# Patient Record
Sex: MALE | Race: WHITE
[De-identification: names, ages, dates, MRNs, and addresses within clinical notes are randomized per-mention and may not be internally consistent; named-entity substitution may affect disease eponyms.]

---

## 2024-04-25 ENCOUNTER — HOSPITAL ENCOUNTER (EMERGENCY)
Dept: HOSPITAL 101 - ER | Age: 69
Discharge: ANOTHER HOSPITAL | End: 2024-04-25
Payer: COMMERCIAL

## 2024-04-25 VITALS — SYSTOLIC BLOOD PRESSURE: 125 MMHG | DIASTOLIC BLOOD PRESSURE: 83 MMHG | HEART RATE: 86 BPM

## 2024-04-25 VITALS
SYSTOLIC BLOOD PRESSURE: 126 MMHG | TEMPERATURE: 98.42 F | HEART RATE: 62 BPM | OXYGEN SATURATION: 95 % | DIASTOLIC BLOOD PRESSURE: 82 MMHG

## 2024-04-25 VITALS — OXYGEN SATURATION: 95 %

## 2024-04-25 VITALS — OXYGEN SATURATION: 96 % | HEART RATE: 82 BPM

## 2024-04-25 VITALS — SYSTOLIC BLOOD PRESSURE: 126 MMHG | DIASTOLIC BLOOD PRESSURE: 82 MMHG

## 2024-04-25 VITALS — BODY MASS INDEX: 31.6 KG/M2

## 2024-04-25 VITALS — OXYGEN SATURATION: 95 % | HEART RATE: 80 BPM

## 2024-04-25 VITALS — SYSTOLIC BLOOD PRESSURE: 121 MMHG | HEART RATE: 84 BPM | DIASTOLIC BLOOD PRESSURE: 83 MMHG | OXYGEN SATURATION: 94 %

## 2024-04-25 VITALS — OXYGEN SATURATION: 96 %

## 2024-04-25 DIAGNOSIS — I21.3: Primary | ICD-10-CM

## 2024-04-25 LAB
ADD MANUAL DIFF: NO
ALANINE AMINOTRANSFERASE: 90 U/L (ref 16–63)
ALBUMIN GLOBULIN RATIO: 1
ALBUMIN LEVEL: 3.8 G/DL (ref 3.4–5)
ALKALINE PHOSPHATASE: 54 U/L (ref 46–116)
ANION GAP: 14.3
ASPARTATE AMINO TRANSFERASE: 593 U/L (ref 15–37)
BLOOD UREA NITROGEN: 13 MG/DL (ref 7–18)
CALCIUM: 9.1 MG/DL (ref 8.5–10.1)
CARBON DIOXIDE: 26.5 MMOL/L (ref 21–32)
CHLORIDE: 96 MMOL/L (ref 98–107)
ESTIMATED GFR (AFRICAN AMERICA: >60 (ref 60–?)
ESTIMATED GFR (NON-AFRICAN AME: >60 (ref 60–?)
GLOBULIN: 3.7 G/DL
GLUCOSE: 157 MG/DL (ref 74–106)
HEMATOCRIT: 41.6 % (ref 42–54)
HEMOGLOBIN: 14.2 G/DL (ref 14–18)
IMMATURE GRANULOCYTES ABS AUTO: 0.04 10^3/UL (ref 0–0.03)
IMMATURE GRANULOCYTES PCT AUTO: 0.4 % (ref 0–0.5)
LYMPHOCYTES  ABSOLUTE AUTO: 1 10^3/UL (ref 1.2–3.8)
MCV RBC: 92.9 FL (ref 80–94)
MEAN CORPUSCULAR HEMOGLOBIN: 31.7 PG (ref 25.9–34)
MEAN CORPUSCULAR HGB CONC: 34.1 G/DL (ref 29.9–35.2)
NON-UH HIE TROPONIN I HIGH SENSITIVITY: ABNORMAL PG/ML (ref 0–20)
PARTIAL THROMBOPLASTIN TIME: 27.3 SEC (ref 22.3–36.2)
PLATELET COUNT: 217 10^3/UL (ref 150–450)
POTASSIUM: 3.8 MMOL/L (ref 3.5–5.1)
RED BLOOD COUNT: 4.48 10^6/UL (ref 4.7–6.1)
SODIUM: 133 MMOL/L (ref 136–145)
TOTAL PROTEIN: 7.5 G/DL (ref 6.4–8.2)
WHITE BLOOD COUNT: 10.5 10^3/UL (ref 4–11)

## 2024-04-25 PROCEDURE — 96375 TX/PRO/DX INJ NEW DRUG ADDON: CPT

## 2024-04-25 PROCEDURE — 99285 EMERGENCY DEPT VISIT HI MDM: CPT

## 2024-04-25 PROCEDURE — 93005 ELECTROCARDIOGRAM TRACING: CPT

## 2024-04-25 PROCEDURE — 84484 ASSAY OF TROPONIN QUANT: CPT

## 2024-04-25 PROCEDURE — 85025 COMPLETE CBC W/AUTO DIFF WBC: CPT

## 2024-04-25 PROCEDURE — 80053 COMPREHEN METABOLIC PANEL: CPT

## 2024-04-25 PROCEDURE — 71045 X-RAY EXAM CHEST 1 VIEW: CPT

## 2024-04-25 PROCEDURE — 96374 THER/PROPH/DIAG INJ IV PUSH: CPT

## 2024-04-25 PROCEDURE — 36415 COLL VENOUS BLD VENIPUNCTURE: CPT

## 2024-04-25 PROCEDURE — 85730 THROMBOPLASTIN TIME PARTIAL: CPT

## 2024-04-25 RX ADMIN — ASPIRIN 81 MG 324 MG: 81 TABLET ORAL at 06:48

## 2024-04-25 RX ADMIN — HEPARIN SODIUM 5000 UNIT: 5000 INJECTION, SOLUTION INTRAVENOUS; SUBCUTANEOUS at 06:52

## 2024-04-25 RX ADMIN — TICAGRELOR 180 MG: 90 TABLET ORAL at 06:52

## 2024-04-25 RX ADMIN — MORPHINE SULFATE 4 MG: 4 INJECTION INTRAVENOUS at 06:53

## 2024-04-25 RX ADMIN — NITROGLYCERIN 0.4 MG: 0.4 TABLET SUBLINGUAL at 07:10

## 2024-04-26 ENCOUNTER — DOCUMENTATION (OUTPATIENT)
Dept: CARDIOLOGY | Facility: CLINIC | Age: 69
End: 2024-04-26
Payer: COMMERCIAL

## 2024-04-29 ENCOUNTER — PATIENT OUTREACH (OUTPATIENT)
Dept: CARDIOLOGY | Facility: CLINIC | Age: 69
End: 2024-04-29

## 2024-04-29 NOTE — PROGRESS NOTES
Discharge Facility: [facility]  Discharge Diagnosis:  STEMI  Admission Date:  4/25/24  Discharge Date: 4/29/24    PCP Appointment Date: Patient encouraged to make an appt  Specialist Appointment Date: Dr Coelho 5/3/24  Hospital Encounter and Summary: not available at this time   See discharge assessment below for further details         Engagement  Call Start Time: 0942 (4/30/2024 10:36 AM)    Medications  Medications reviewed with patient/caregiver?: Yes (4/30/2024 10:36 AM)  Is the patient having any side effects they believe may be caused by any medication additions or changes?: (!) Yes (Patient states he is having some SOB with his Brilinta and is calling Dr Coelho's office now) (4/30/2024 10:36 AM)  Does the patient have all medications ordered at discharge?: Yes (4/30/2024 10:36 AM)  Prescription Comments: This CM does not have a medication req (4/30/2024 10:36 AM)  Is the patient taking all medications as directed (includes completed medication regime)?: -- (Patient states yes) (4/30/2024 10:36 AM)    Appointments  Does the patient have a primary care provider?: Yes (4/30/2024 10:36 AM)  Care Management Interventions: Advised patient to make appointment (4/30/2024 10:36 AM)  Care Management Interventions: Advised patient to keep appointment (4/30/2024 10:36 AM)    Patient Teaching  What is the patient's perception of their health status since discharge?: Improving (4/30/2024 10:36 AM)  Is the patient/caregiver able to teach back the hierarchy of who to call/visit for symptoms/problems? PCP, Specialist, Home Health nurse, Urgent Care, ED, 911: Yes (4/30/2024 10:36 AM)  Patient/Caregiver Education Comments: Reviewed monitoring Right Groin site. Medications reviewed which medication were stated to this CM that were prescribed. Patient is calling Dr Coelho's office about medications (4/30/2024 10:36 AM)    Wrap Up  Wrap Up Additional Comments: Patient denies any CP. Patient states he has some SOB which he  states is related to Brilinta and is calling Dr Coelho's office. This CM does review that SOB needs evaluated at ED. Patient states that his Right groin site continues with bandage and he will remove later but he denies any draiange from the site,increased pain redness or swelling. Patient is aware of folllow up appt with Dr Coelho and is encouraged to make an appt with PCP (4/30/2024 10:36 AM)

## 2024-04-30 ENCOUNTER — TELEPHONE (OUTPATIENT)
Dept: CARDIOLOGY | Facility: CLINIC | Age: 69
End: 2024-04-30
Payer: COMMERCIAL

## 2024-04-30 NOTE — TELEPHONE ENCOUNTER
Wife called into office that Brilinta has been causing the patient SOB. POV 5/2 with Dr. Prasanna Coelho DO. Advised that they should continue as is and will discuss change at OV.     Pharmacy advised patient to hold Statin drug while he takes a 10 dose of cholcine for inflammatory. Inquiring if they should do this or not?    To Dr. Prasanna Coelho DO for review.

## 2024-05-02 ENCOUNTER — OFFICE VISIT (OUTPATIENT)
Dept: CARDIOLOGY | Facility: CLINIC | Age: 69
End: 2024-05-02
Payer: COMMERCIAL

## 2024-05-02 VITALS
DIASTOLIC BLOOD PRESSURE: 78 MMHG | SYSTOLIC BLOOD PRESSURE: 108 MMHG | HEIGHT: 70 IN | WEIGHT: 227 LBS | BODY MASS INDEX: 32.5 KG/M2 | HEART RATE: 64 BPM

## 2024-05-02 DIAGNOSIS — I25.5 CARDIOMYOPATHY, ISCHEMIC: ICD-10-CM

## 2024-05-02 DIAGNOSIS — I31.39 PERICARDIAL EFFUSION (HHS-HCC): ICD-10-CM

## 2024-05-02 DIAGNOSIS — Z98.61 HISTORY OF PTCA: ICD-10-CM

## 2024-05-02 DIAGNOSIS — I50.9 CONGESTIVE HEART FAILURE, UNSPECIFIED HF CHRONICITY, UNSPECIFIED HEART FAILURE TYPE (MULTI): ICD-10-CM

## 2024-05-02 DIAGNOSIS — I21.3 ST ELEVATION MYOCARDIAL INFARCTION (STEMI), UNSPECIFIED ARTERY (MULTI): ICD-10-CM

## 2024-05-02 DIAGNOSIS — Z87.891 FORMER SMOKER: ICD-10-CM

## 2024-05-02 DIAGNOSIS — R06.02 SHORTNESS OF BREATH: ICD-10-CM

## 2024-05-02 DIAGNOSIS — I25.10 ASHD (ARTERIOSCLEROTIC HEART DISEASE): ICD-10-CM

## 2024-05-02 PROCEDURE — 1160F RVW MEDS BY RX/DR IN RCRD: CPT | Performed by: INTERNAL MEDICINE

## 2024-05-02 PROCEDURE — 99496 TRANSJ CARE MGMT HIGH F2F 7D: CPT | Performed by: INTERNAL MEDICINE

## 2024-05-02 PROCEDURE — 1159F MED LIST DOCD IN RCRD: CPT | Performed by: INTERNAL MEDICINE

## 2024-05-02 PROCEDURE — 1036F TOBACCO NON-USER: CPT | Performed by: INTERNAL MEDICINE

## 2024-05-02 RX ORDER — COLCHICINE 0.6 MG/1
0.6 TABLET ORAL DAILY
COMMUNITY
Start: 2024-04-29 | End: 2024-05-09

## 2024-05-02 RX ORDER — NITROGLYCERIN 0.4 MG/1
0.4 TABLET SUBLINGUAL
COMMUNITY
Start: 2024-04-26

## 2024-05-02 RX ORDER — CARVEDILOL 3.12 MG/1
3.12 TABLET ORAL
COMMUNITY
Start: 2024-04-29

## 2024-05-02 RX ORDER — SPIRONOLACTONE 25 MG/1
12.5 TABLET ORAL 2 TIMES DAILY
COMMUNITY
Start: 2024-04-29

## 2024-05-02 RX ORDER — ASPIRIN 81 MG/1
81 TABLET ORAL DAILY
COMMUNITY
Start: 2024-04-26

## 2024-05-02 RX ORDER — ALBUTEROL SULFATE 90 UG/1
AEROSOL, METERED RESPIRATORY (INHALATION)
COMMUNITY

## 2024-05-02 RX ORDER — CLOPIDOGREL BISULFATE 75 MG/1
75 TABLET ORAL DAILY
Qty: 90 TABLET | Refills: 3 | Status: SHIPPED | OUTPATIENT
Start: 2024-05-02 | End: 2025-05-02

## 2024-05-02 RX ORDER — LANOLIN ALCOHOL/MO/W.PET/CERES
100 CREAM (GRAM) TOPICAL DAILY
COMMUNITY
Start: 2024-03-31

## 2024-05-02 RX ORDER — ATORVASTATIN CALCIUM 80 MG/1
80 TABLET, FILM COATED ORAL
COMMUNITY
Start: 2024-04-26 | End: 2024-05-14 | Stop reason: SINTOL

## 2024-05-02 RX ORDER — CLOPIDOGREL BISULFATE 300 MG/1
600 TABLET, FILM COATED ORAL ONCE
Qty: 2 TABLET | Refills: 0 | Status: SHIPPED | OUTPATIENT
Start: 2024-05-02 | End: 2024-05-02

## 2024-05-02 ASSESSMENT — ENCOUNTER SYMPTOMS: SHORTNESS OF BREATH: 1

## 2024-05-02 NOTE — LETTER
May 2, 2024     Ike Johns MD  [address] [facility] [address]  [facility] [address]    Patient: Juan A Linag   YOB: 1955   Date of Visit: 5/2/2024       Dear Dr. Ike Johns MD:    Thank you for referring Juan A Liang to me for evaluation. Below are my notes for this consultation.  If you have questions, please do not hesitate to call me. I look forward to following your patient along with you.       Sincerely,     Prasanna Coelho, DO      CC: No Recipients  ______________________________________________________________________________________    Subjective   Juan A Liang is a 68 y.o. male       Chief Complaint    Follow-up          68-year-old gentleman here for transitional care management office visit following recent anterior ST elevation myocardial infarction with primary revascularization of the proximal/mid LAD with 2 drug-eluting stents, end-stage interventions of the proximal and mid circumflex with 2 drug-eluting stents and the RCA with 1 drug-eluting stent.  LV function was severe with anteroapical, inferoapical and septal akinesis and ejection fraction of 30%.    Current complaints are orthopnea and difficulty sleeping at night potentially related to Brilinta side effect.  He as he sits here today he has no dyspnea.    There is no prior history of myocardial infarction, no history of diabetes or tobacco use.  He has a strong family history of coronary disease    Notably post MI immediately in the hospital he had a 3 component friction rub with small pericardial effusion consistent with post MI pericarditis.  On today's exam 3 component rub is completely gone.  He has 5 more days of colchicine.  Blood pressures continue to be soft as they were in the hospital, he is not on afterload reduction therapies as of yet as he was intolerant in the hospital and likely would be intolerant at the present time given his current blood pressure readings.    Recommendations: Discontinue  "Brilinta initiate clopidogrel with a loading dose, obtain another limited echocardiogram in 6 weeks to reassess LV function, pericardial effusion and to survey for mural thrombus treat accordingly and if necessary referral for AICD; refer to cardiac rehab in [place].  Follow-up with nurse practitioner in 6 weeks after echo to see if Medication titration is possible.  I will see him again within the next 3 months.         Review of Systems   Constitutional: Positive for malaise/fatigue.   Respiratory:  Positive for shortness of breath.    All other systems reviewed and are negative.           Vitals:    05/02/24 1338   BP: 108/78   BP Location: Right arm   Patient Position: Sitting   Pulse: 64   Weight: 103 kg (227 lb)   Height: 1.778 m (5' 10\")        Objective   Physical Exam  Constitutional:       Appearance: Normal appearance.   HENT:      Nose: Nose normal.   Neck:      Vascular: No carotid bruit.   Cardiovascular:      Rate and Rhythm: Normal rate.      Pulses: Normal pulses.      Heart sounds: Normal heart sounds.   Pulmonary:      Effort: Pulmonary effort is normal.   Abdominal:      General: Bowel sounds are normal.      Palpations: Abdomen is soft.   Musculoskeletal:         General: Normal range of motion.      Cervical back: Normal range of motion.      Right lower leg: No edema.      Left lower leg: No edema.   Skin:     General: Skin is warm and dry.   Neurological:      General: No focal deficit present.      Mental Status: He is alert.   Psychiatric:         Mood and Affect: Mood normal.         Behavior: Behavior normal.         Thought Content: Thought content normal.         Judgment: Judgment normal.         Allergies  Brilinta [ticagrelor]     Current Medications    Current Outpatient Medications:   •  aspirin 81 mg EC tablet, Take 1 tablet (81 mg) by mouth once daily., Disp: , Rfl:   •  atorvastatin (Lipitor) 80 mg tablet, Take 1 tablet (80 mg) by mouth once daily., Disp: , Rfl:   •  carvedilol " (Coreg) 3.125 mg tablet, Take 1 tablet (3.125 mg) by mouth 2 times a day with meals., Disp: , Rfl:   •  colchicine 0.6 mg tablet, Take 1 tablet (0.6 mg) by mouth once daily., Disp: , Rfl:   •  nitroglycerin (Nitrostat) 0.4 mg SL tablet, Place 1 tablet (0.4 mg) under the tongue., Disp: , Rfl:   •  spironolactone (Aldactone) 25 mg tablet, Take 0.5 tablets (12.5 mg) by mouth twice a day., Disp: , Rfl:   •  thiamine 100 mg tablet, Take 1 tablet (100 mg) by mouth once daily., Disp: , Rfl:   •  albuterol 90 mcg/actuation inhaler, INHALE 2 PUFFS EVERY 4-6 HOURS Inhaled as needed for 90 days, Disp: , Rfl:                      Assessment/Plan   1. ASHD (arteriosclerotic heart disease)        2. ST elevation myocardial infarction (STEMI), unspecified artery (Multi)        3. History of PTCA        4. Shortness of breath        5. Cardiomyopathy, ischemic        6. Congestive heart failure, unspecified HF chronicity, unspecified heart failure type (Multi)        7. BMI 32.0-32.9,adult        8. Former smoker        9. Pericardial effusion ([facility])                 Scribe Attestation  By signing my name below, Vanessa BLANK LPN, Scribe   attest that this documentation has been prepared under the direction and in the presence of Prasanna Coelho DO.     Provider Attestation - Scribe documentation    All medical record entries made by the Scribe were at my direction and personally dictated by me. I have reviewed the chart and agree that the record accurately reflects my personal performance of the history, physical exam, discussion and plan.

## 2024-05-02 NOTE — PROGRESS NOTES
Subjective   Juan A Liang is a 68 y.o. male       Chief Complaint    Follow-up          68-year-old gentleman here for transitional care management office visit following recent anterior ST elevation myocardial infarction with primary revascularization of the proximal/mid LAD with 2 drug-eluting stents, end-stage interventions of the proximal and mid circumflex with 2 drug-eluting stents and the RCA with 1 drug-eluting stent.  LV function was severe with anteroapical, inferoapical and septal akinesis and ejection fraction of 30%.    Current complaints are orthopnea and difficulty sleeping at night potentially related to Brilinta side effect.  He as he sits here today he has no dyspnea.    There is no prior history of myocardial infarction, no history of diabetes or tobacco use.  He has a strong family history of coronary disease    Notably post MI immediately in the hospital he had a 3 component friction rub with small pericardial effusion consistent with post MI pericarditis.  On today's exam 3 component rub is completely gone.  He has 5 more days of colchicine.  Blood pressures continue to be soft as they were in the hospital, he is not on afterload reduction therapies as of yet as he was intolerant in the hospital and likely would be intolerant at the present time given his current blood pressure readings.    Recommendations: Discontinue Brilinta initiate clopidogrel with a loading dose, obtain another limited echocardiogram in 6 weeks to reassess LV function, pericardial effusion and to survey for mural thrombus treat accordingly and if necessary referral for AICD; refer to cardiac rehab in [place].  Follow-up with nurse practitioner in 6 weeks after echo to see if Medication titration is possible.  I will see him again within the next 3 months.         Review of Systems   Constitutional: Positive for malaise/fatigue.   Respiratory:  Positive for shortness of breath.    All other systems reviewed and are  "negative.           Vitals:    05/02/24 1338   BP: 108/78   BP Location: Right arm   Patient Position: Sitting   Pulse: 64   Weight: 103 kg (227 lb)   Height: 1.778 m (5' 10\")        Objective   Physical Exam  Constitutional:       Appearance: Normal appearance.   HENT:      Nose: Nose normal.   Neck:      Vascular: No carotid bruit.   Cardiovascular:      Rate and Rhythm: Normal rate.      Pulses: Normal pulses.      Heart sounds: Normal heart sounds.   Pulmonary:      Effort: Pulmonary effort is normal.   Abdominal:      General: Bowel sounds are normal.      Palpations: Abdomen is soft.   Musculoskeletal:         General: Normal range of motion.      Cervical back: Normal range of motion.      Right lower leg: No edema.      Left lower leg: No edema.   Skin:     General: Skin is warm and dry.   Neurological:      General: No focal deficit present.      Mental Status: He is alert.   Psychiatric:         Mood and Affect: Mood normal.         Behavior: Behavior normal.         Thought Content: Thought content normal.         Judgment: Judgment normal.         Allergies  Brilinta [ticagrelor]     Current Medications    Current Outpatient Medications:     aspirin 81 mg EC tablet, Take 1 tablet (81 mg) by mouth once daily., Disp: , Rfl:     atorvastatin (Lipitor) 80 mg tablet, Take 1 tablet (80 mg) by mouth once daily., Disp: , Rfl:     carvedilol (Coreg) 3.125 mg tablet, Take 1 tablet (3.125 mg) by mouth 2 times a day with meals., Disp: , Rfl:     colchicine 0.6 mg tablet, Take 1 tablet (0.6 mg) by mouth once daily., Disp: , Rfl:     nitroglycerin (Nitrostat) 0.4 mg SL tablet, Place 1 tablet (0.4 mg) under the tongue., Disp: , Rfl:     spironolactone (Aldactone) 25 mg tablet, Take 0.5 tablets (12.5 mg) by mouth twice a day., Disp: , Rfl:     thiamine 100 mg tablet, Take 1 tablet (100 mg) by mouth once daily., Disp: , Rfl:     albuterol 90 mcg/actuation inhaler, INHALE 2 PUFFS EVERY 4-6 HOURS Inhaled as needed for 90 " days, Disp: , Rfl:                      Assessment/Plan   1. ASHD (arteriosclerotic heart disease)        2. ST elevation myocardial infarction (STEMI), unspecified artery (Multi)        3. History of PTCA        4. Shortness of breath        5. Cardiomyopathy, ischemic        6. Congestive heart failure, unspecified HF chronicity, unspecified heart failure type (Multi)        7. BMI 32.0-32.9,adult        8. Former smoker        9. Pericardial effusion ([facility])                 Scribe Attestation  By signing my name below, IVanessa LPN, Scribe   attest that this documentation has been prepared under the direction and in the presence of Prasanna Coelho DO.     Provider Attestation - Scribe documentation    All medical record entries made by the Scribe were at my direction and personally dictated by me. I have reviewed the chart and agree that the record accurately reflects my personal performance of the history, physical exam, discussion and plan.

## 2024-05-02 NOTE — PATIENT INSTRUCTIONS
Please bring all medicines, vitamins, and herbal supplements with you when you come to the office.    Prescriptions will not be filled unless you are compliant with your follow up appointments or have a follow up appointment scheduled as per instruction of your physician. Refills should be requested at the time of your visit.     BMI was above normal measurement. Current weight: 103 kg (227 lb)  Weight change since last visit (-) denotes wt loss 227 lbs   Weight loss needed to achieve BMI 25: 53.1 Lbs  Weight loss needed to achieve BMI 30: 18.4 Lbs  Provided instructions on dietary changes  Provided instructions on exercise.

## 2024-05-07 ENCOUNTER — PATIENT OUTREACH (OUTPATIENT)
Dept: CARDIOLOGY | Facility: CLINIC | Age: 69
End: 2024-05-07
Payer: COMMERCIAL

## 2024-05-07 ENCOUNTER — TELEPHONE (OUTPATIENT)
Dept: CARDIOLOGY | Facility: CLINIC | Age: 69
End: 2024-05-07
Payer: COMMERCIAL

## 2024-05-07 NOTE — TELEPHONE ENCOUNTER
Patient would like a referral to cardiac rehab at the [facility]. They need a face sheet and office notes if you are in agreement with this    Please advise

## 2024-05-07 NOTE — PROGRESS NOTES
Unable to reach patient for call back after patient's follow up appointment with PCP.   HARVEYM with call back number for patient to call if needed   If no voicemail available call attempts x 2 were made to contact the patient to assist with any questions or concerns patient may have.

## 2024-05-13 DIAGNOSIS — E78.00 HYPERCHOLESTEREMIA: ICD-10-CM

## 2024-05-13 NOTE — TELEPHONE ENCOUNTER
Patient phone that he was recently prescribed and started Lipitor. States he has extreme fatigue, unmotivated and depressed. He would like off this asap and changed to a different medication. Please advise.    To Dr. Prasanna Coelho, DO

## 2024-05-15 RX ORDER — ROSUVASTATIN CALCIUM 5 MG/1
5 TABLET, COATED ORAL EVERY OTHER DAY
Qty: 45 TABLET | Refills: 3 | Status: SHIPPED | OUTPATIENT
Start: 2024-05-15 | End: 2025-05-15

## 2024-05-24 ENCOUNTER — TELEPHONE (OUTPATIENT)
Dept: CARDIOLOGY | Facility: CLINIC | Age: 69
End: 2024-05-24
Payer: COMMERCIAL

## 2024-05-24 NOTE — TELEPHONE ENCOUNTER
Patient phoned states he has been having trace amount of blood when he blows his nose, believes it is related to taking Aspirin. Denies having blood in his urine or stool. Denies having cardiac complaints. Inquiring if there is alternative he could take, or different dose he could take.    To Dr. Prasanna Coelho DO for review.

## 2024-06-05 ENCOUNTER — PATIENT OUTREACH (OUTPATIENT)
Dept: CARDIOLOGY | Facility: CLINIC | Age: 69
End: 2024-06-05
Payer: COMMERCIAL

## 2024-06-17 ENCOUNTER — HOSPITAL ENCOUNTER (OUTPATIENT)
Dept: CARDIOLOGY | Facility: CLINIC | Age: 69
Discharge: HOME | End: 2024-06-17
Payer: COMMERCIAL

## 2024-06-17 VITALS
BODY MASS INDEX: 32.5 KG/M2 | SYSTOLIC BLOOD PRESSURE: 110 MMHG | DIASTOLIC BLOOD PRESSURE: 68 MMHG | HEIGHT: 70 IN | WEIGHT: 227 LBS

## 2024-06-17 DIAGNOSIS — I21.3 ST ELEVATION MYOCARDIAL INFARCTION (STEMI), UNSPECIFIED ARTERY (MULTI): ICD-10-CM

## 2024-06-17 DIAGNOSIS — I25.5 CARDIOMYOPATHY, ISCHEMIC: ICD-10-CM

## 2024-06-17 DIAGNOSIS — I31.39 PERICARDIAL EFFUSION (HHS-HCC): ICD-10-CM

## 2024-06-17 DIAGNOSIS — Z98.61 HISTORY OF PTCA: ICD-10-CM

## 2024-06-17 DIAGNOSIS — I25.10 ASHD (ARTERIOSCLEROTIC HEART DISEASE): ICD-10-CM

## 2024-06-17 DIAGNOSIS — I50.9 CONGESTIVE HEART FAILURE, UNSPECIFIED HF CHRONICITY, UNSPECIFIED HEART FAILURE TYPE (MULTI): ICD-10-CM

## 2024-06-17 DIAGNOSIS — R06.02 SHORTNESS OF BREATH: ICD-10-CM

## 2024-06-17 PROCEDURE — 2500000004 HC RX 250 GENERAL PHARMACY W/ HCPCS (ALT 636 FOR OP/ED): Performed by: INTERNAL MEDICINE

## 2024-06-17 PROCEDURE — 93308 TTE F-UP OR LMTD: CPT | Performed by: INTERNAL MEDICINE

## 2024-06-17 PROCEDURE — 93308 TTE F-UP OR LMTD: CPT

## 2024-06-18 LAB
EJECTION FRACTION APICAL 4 CHAMBER: 32.6
LEFT VENTRICLE INTERNAL DIMENSION DIASTOLE: 5.91 CM (ref 3.5–6)
LEFT VENTRICULAR OUTFLOW TRACT DIAMETER: 2.5 CM

## 2024-06-19 ENCOUNTER — APPOINTMENT (OUTPATIENT)
Dept: CARDIOLOGY | Facility: CLINIC | Age: 69
End: 2024-06-19
Payer: COMMERCIAL

## 2024-06-19 VITALS
HEART RATE: 68 BPM | SYSTOLIC BLOOD PRESSURE: 110 MMHG | DIASTOLIC BLOOD PRESSURE: 80 MMHG | HEIGHT: 70 IN | BODY MASS INDEX: 28.77 KG/M2 | WEIGHT: 201 LBS

## 2024-06-19 DIAGNOSIS — J90 PLEURAL EFFUSION: ICD-10-CM

## 2024-06-19 DIAGNOSIS — E78.2 MIXED HYPERLIPIDEMIA: ICD-10-CM

## 2024-06-19 DIAGNOSIS — I31.39 PERICARDIAL EFFUSION (HHS-HCC): ICD-10-CM

## 2024-06-19 DIAGNOSIS — I25.10 ASHD (ARTERIOSCLEROTIC HEART DISEASE): ICD-10-CM

## 2024-06-19 DIAGNOSIS — I25.5 CARDIOMYOPATHY, ISCHEMIC: Primary | ICD-10-CM

## 2024-06-19 PROCEDURE — 99214 OFFICE O/P EST MOD 30 MIN: CPT | Performed by: NURSE PRACTITIONER

## 2024-06-19 PROCEDURE — 3008F BODY MASS INDEX DOCD: CPT | Performed by: NURSE PRACTITIONER

## 2024-06-19 PROCEDURE — 1160F RVW MEDS BY RX/DR IN RCRD: CPT | Performed by: NURSE PRACTITIONER

## 2024-06-19 PROCEDURE — 1036F TOBACCO NON-USER: CPT | Performed by: NURSE PRACTITIONER

## 2024-06-19 PROCEDURE — 1159F MED LIST DOCD IN RCRD: CPT | Performed by: NURSE PRACTITIONER

## 2024-06-19 RX ORDER — LOSARTAN POTASSIUM 25 MG/1
25 TABLET ORAL DAILY
Qty: 30 TABLET | Refills: 11 | Status: SHIPPED | OUTPATIENT
Start: 2024-06-19 | End: 2025-06-19

## 2024-06-19 ASSESSMENT — ENCOUNTER SYMPTOMS
IRREGULAR HEARTBEAT: 0
PALPITATIONS: 0
NEAR-SYNCOPE: 0
ORTHOPNEA: 0
DYSPNEA ON EXERTION: 0
PND: 0
SYNCOPE: 0

## 2024-06-19 NOTE — LETTER
"June 19, 2024     Ike Johns MD  [address]  [facility] [address]    Patient: Juan A Liang   YOB: 1955   Date of Visit: 6/19/2024       Dear Dr. Ike Johns MD:    Thank you for referring Juan A Liang to me for evaluation. Below are my notes for this consultation.  If you have questions, please do not hesitate to call me. I look forward to following your patient along with you.       Sincerely,     Karen James, APRN-CNP      CC: No Recipients  ______________________________________________________________________________________    Chief Complaint  \"I think I am doing really good\"     Reason for Visit  Patient presents to the office today for outpatient follow-up for testing follow-up.  Last evaluated in clinic by Dr. Coelho May 2024.  Patient STEMI was April 25, 2024 and EF was 30-35%.  Due to hypotension unable to initiate optimal guideline directed medical therapy, Dr. Coelho repeated echocardiogram early in June 2024 to assist with treatment.  LVEF continues to be right about 30%, LV is mildly dilated with mid/doer anterior septal, apex, distal inferior and anterior lateral dyskinetic walls.  Reviewed in detail with patient, will be more aggressive with initiation of guideline directed medical treatment.    Presents today ambulatory with steady gait.  Accompanied by spouse  Patient denies any hospitalizations or significant changes to interval medical history since last office follow-up.     History of Present Illness   Patient is a very pleasant 69-year-old gentleman who presents to the office today reportedly\" doing good\".  He started cardiac rehab, has returned to work.  He denies any dyspnea on exertion, no orthopnea or PND.  No edema.  No palpitations no history of syncope.  He reports his angina symptom was a sudden onset of shoulder pressure, denies any recurrence.    Reports that prior complaints of shortness of breath have completely abated with " "transition off of Brilinta.    Has a history of essential tremors, does see neurology on a regular basis.    Discussed the 4 pillars of guideline directed medical therapy.  Will add low-dose Cozaar at bedtime, add Jardiance.  Repeat MUGA scan at 90 days post PCI.    Patient reports that overall has no complaint(s) of chest pain, chest pressure/discomfort, claudication, dyspnea, exertional chest pressure/discomfort, fatigue, irregular heart beat, orthopnea, and syncope    Daily activity:   Cardiac rehab  Reports improvement in exercise capacity or functional tolerance since last office visit.    The importance of secondary prevention reviewed:  HTN: Optimal  HLD: Is due for labs  DM: Denies  Smoker: Denies  BMI:  Reviewed the merits of healthy lifestyle choices on overall cardiovascular health.    Will repeat chest x-ray due to left pleural effusion noted on echo, will add diuretic if needed.    Review of Systems   Cardiovascular:  Negative for chest pain, dyspnea on exertion, irregular heartbeat, leg swelling, near-syncope, orthopnea, palpitations, paroxysmal nocturnal dyspnea and syncope.        Visit Vitals  /80 (BP Location: Left arm, Patient Position: Sitting)   Pulse 68   Ht 1.778 m (5' 10\")   Wt 91.2 kg (201 lb)   BMI 28.84 kg/m²   Smoking Status Former   BSA 2.12 m²     Physical Exam  Vitals and nursing note reviewed.   Constitutional:       Appearance: Normal appearance.   Cardiovascular:      Rate and Rhythm: Normal rate and regular rhythm.      Heart sounds: Normal heart sounds.   Pulmonary:      Effort: Pulmonary effort is normal.      Breath sounds: Examination of the left-lower field reveals decreased breath sounds. Decreased breath sounds present.   Musculoskeletal:      Cervical back: Full passive range of motion without pain.      Right lower leg: No edema.      Left lower leg: No edema.   Skin:     General: Skin is cool.   Neurological:      Mental Status: He is alert and oriented to person, " place, and time.   Psychiatric:         Attention and Perception: Attention normal.         Mood and Affect: Mood normal.         Behavior: Behavior is cooperative.        Allergies   Allergen Reactions   • Brilinta [Ticagrelor] Shortness of breath     Current Outpatient Medications   Medication Instructions   • albuterol 90 mcg/actuation inhaler INHALE 2 PUFFS EVERY 4-6 HOURS Inhaled as needed for 90 days   • aspirin 81 mg, oral, Daily   • carvedilol (COREG) 3.125 mg, oral, 2 times daily (morning and late afternoon)   • clopidogrel (PLAVIX) 75 mg, oral, Daily   • empagliflozin (JARDIANCE) 10 mg, oral, Daily   • losartan (COZAAR) 25 mg, oral, Daily   • nitroglycerin (NITROSTAT) 0.4 mg, sublingual   • rosuvastatin (CRESTOR) 5 mg, oral, Every other day   • spironolactone (ALDACTONE) 12.5 mg, oral, 2 times daily   • thiamine (VITAMIN B-1) 100 mg, oral, Daily      Assessment:    ASHD (arteriosclerotic heart disease)  April 25, 2025 AL STEMI emergently Dr. Coelho  Proximal LAD PCI/[place] 3 x 18 and 2.75 x 15 mm   Ostial Diag1 & Diag2 85-90%  Had staged procedure:  Mid RCA PCI/[place] 3.5 x 30 mm  Provisional PTCA PLV  There is also staged procedure to the circumflex proximal and mid but details are not available    LVEF 30 to 35% with akinetic anterior, anterior septal and apical walls    Cardiomyopathy, ischemic  DICM HFrEF 30% June 2024 TTE ( April 25, 2024 TTE 30-35%)  FC II Stage C    GDMT  Coreg: April 2024 discharge  Spironolactone: April 2024 discharge  Jardiance: to pharmacy today  ACE/ARB: Hindered due to hypotension but will attempt to add low-dose Cozaar today.    Right bundle branch block with     Pericardial effusion ([facility])  I believe at time of hospitalization he had a pericardial effusion and was treated with colchicine -repeat TTE June 2024 no evidence of pericardial effusion but it does show a moderate left pleural effusion.    Mixed hyperlipidemia  Tolerating addition of high intensity  "statin    BMI 28.0-28.9,adult  His weight is down approximately 26 pounds since discharge.  He reports that he simply\" stopped eating\" because he had no appetite and nothing would taste good.  He denies any history of diabetes  Reports compliance with preventative cancer screening  Remote smoker    Follows weight regularly at home and reports his appetite is getting better and he is slowly starting to gain weight.  Discussed red flag of unintentional weight gain, they have been cautioned to notify their PCP if continues.    Pleural effusion  June 2024 echocardiogram with incidental finding of a moderate left pleural effusion  He denies any type of shortness of breath, no dyspnea on exertion, no orthopnea no PND.    Plan:     Through informed decision making process incorporating patients unique circumstances, the following treatment plan will be initiated:    1.  Prescription drug management of cardiovascular medication for efficacy, adherence to treatment, side effect assessment and polypharmacy. Current treatment clinically warranted and to continue with following modifications:    - Cozaar 25mg at bedtime or 2 hours after morning dose of coreg  -  Jardiance 10mg daily    2.  CXR pa/lat - follow up on pleural effusions noted on echo    3.  One week labs (lipids, chem6)    4.  MUGA after July 25, 2024 (ICM)    5. Return for follow-up; in the interim, contact the office if new symptoms arise.  Dr. Coelho after testing     6.  If weight loss continues then you need to contact PCP    Karen James  MSN, APRN-CNP, PMHNP-BC  [facility]    Please excuse any errors in grammar or translation related to this dictation. Voice recognition software was utilized to prepare this document.  "

## 2024-06-19 NOTE — ASSESSMENT & PLAN NOTE
"His weight is down approximately 26 pounds since discharge.  He reports that he simply\" stopped eating\" because he had no appetite and nothing would taste good.  He denies any history of diabetes  Reports compliance with preventative cancer screening  Remote smoker    Follows weight regularly at home and reports his appetite is getting better and he is slowly starting to gain weight.  Discussed red flag of unintentional weight gain, they have been cautioned to notify their PCP if continues.  "

## 2024-06-19 NOTE — PROGRESS NOTES
"Chief Complaint  \"I think I am doing really good\"     Reason for Visit  Patient presents to the office today for outpatient follow-up for testing follow-up.  Last evaluated in clinic by Dr. Coelho May 2024.  Patient STEMI was April 25, 2024 and EF was 30-35%.  Due to hypotension unable to initiate optimal guideline directed medical therapy, Dr. Coelho repeated echocardiogram early in June 2024 to assist with treatment.  LVEF continues to be right about 30%, LV is mildly dilated with mid/doer anterior septal, apex, distal inferior and anterior lateral dyskinetic walls.  Reviewed in detail with patient, will be more aggressive with initiation of guideline directed medical treatment.    Presents today ambulatory with steady gait.  Accompanied by spouse  Patient denies any hospitalizations or significant changes to interval medical history since last office follow-up.     History of Present Illness   Patient is a very pleasant 69-year-old gentleman who presents to the office today reportedly\" doing good\".  He started cardiac rehab, has returned to work.  He denies any dyspnea on exertion, no orthopnea or PND.  No edema.  No palpitations no history of syncope.  He reports his angina symptom was a sudden onset of shoulder pressure, denies any recurrence.    Reports that prior complaints of shortness of breath have completely abated with transition off of Brilinta.    Has a history of essential tremors, does see neurology on a regular basis.    Discussed the 4 pillars of guideline directed medical therapy.  Will add low-dose Cozaar at bedtime, add Jardiance.  Repeat MUGA scan at 90 days post PCI.    Patient reports that overall has no complaint(s) of chest pain, chest pressure/discomfort, claudication, dyspnea, exertional chest pressure/discomfort, fatigue, irregular heart beat, orthopnea, and syncope    Daily activity:   Cardiac rehab  Reports improvement in exercise capacity or functional tolerance since last office " "visit.    The importance of secondary prevention reviewed:  HTN: Optimal  HLD: Is due for labs  DM: Denies  Smoker: Denies  BMI:  Reviewed the merits of healthy lifestyle choices on overall cardiovascular health.    Will repeat chest x-ray due to left pleural effusion noted on echo, will add diuretic if needed.    Review of Systems   Cardiovascular:  Negative for chest pain, dyspnea on exertion, irregular heartbeat, leg swelling, near-syncope, orthopnea, palpitations, paroxysmal nocturnal dyspnea and syncope.        Visit Vitals  /80 (BP Location: Left arm, Patient Position: Sitting)   Pulse 68   Ht 1.778 m (5' 10\")   Wt 91.2 kg (201 lb)   BMI 28.84 kg/m²   Smoking Status Former   BSA 2.12 m²     Physical Exam  Vitals and nursing note reviewed.   Constitutional:       Appearance: Normal appearance.   Cardiovascular:      Rate and Rhythm: Normal rate and regular rhythm.      Heart sounds: Normal heart sounds.   Pulmonary:      Effort: Pulmonary effort is normal.      Breath sounds: Examination of the left-lower field reveals decreased breath sounds. Decreased breath sounds present.   Musculoskeletal:      Cervical back: Full passive range of motion without pain.      Right lower leg: No edema.      Left lower leg: No edema.   Skin:     General: Skin is cool.   Neurological:      Mental Status: He is alert and oriented to person, place, and time.   Psychiatric:         Attention and Perception: Attention normal.         Mood and Affect: Mood normal.         Behavior: Behavior is cooperative.        Allergies   Allergen Reactions    Brilinta [Ticagrelor] Shortness of breath     Current Outpatient Medications   Medication Instructions    albuterol 90 mcg/actuation inhaler INHALE 2 PUFFS EVERY 4-6 HOURS Inhaled as needed for 90 days    aspirin 81 mg, oral, Daily    carvedilol (COREG) 3.125 mg, oral, 2 times daily (morning and late afternoon)    clopidogrel (PLAVIX) 75 mg, oral, Daily    empagliflozin (JARDIANCE) 10 " "mg, oral, Daily    losartan (COZAAR) 25 mg, oral, Daily    nitroglycerin (NITROSTAT) 0.4 mg, sublingual    rosuvastatin (CRESTOR) 5 mg, oral, Every other day    spironolactone (ALDACTONE) 12.5 mg, oral, 2 times daily    thiamine (VITAMIN B-1) 100 mg, oral, Daily      Assessment:    ASHD (arteriosclerotic heart disease)  April 25, 2025 AL STEMI emergently Dr. Coelho  Proximal LAD PCI/[place] 3 x 18 and 2.75 x 15 mm   Ostial Diag1 & Diag2 85-90%  Had staged procedure:  Mid RCA PCI/[place] 3.5 x 30 mm  Provisional PTCA PLV  There is also staged procedure to the circumflex proximal and mid but details are not available    LVEF 30 to 35% with akinetic anterior, anterior septal and apical walls    Cardiomyopathy, ischemic  DICM HFrEF 30% June 2024 TTE ( April 25, 2024 TTE 30-35%)  FC II Stage C    GDMT  Coreg: April 2024 discharge  Spironolactone: April 2024 discharge  Jardiance: to pharmacy today  ACE/ARB: Hindered due to hypotension but will attempt to add low-dose Cozaar today.    Right bundle branch block with     Pericardial effusion ([facility])  I believe at time of hospitalization he had a pericardial effusion and was treated with colchicine -repeat TTE June 2024 no evidence of pericardial effusion but it does show a moderate left pleural effusion.    Mixed hyperlipidemia  Tolerating addition of high intensity statin    BMI 28.0-28.9,adult  His weight is down approximately 26 pounds since discharge.  He reports that he simply\" stopped eating\" because he had no appetite and nothing would taste good.  He denies any history of diabetes  Reports compliance with preventative cancer screening  Remote smoker    Follows weight regularly at home and reports his appetite is getting better and he is slowly starting to gain weight.  Discussed red flag of unintentional weight gain, they have been cautioned to notify their PCP if continues.    Pleural effusion  June 2024 echocardiogram with incidental finding of a moderate left " pleural effusion  He denies any type of shortness of breath, no dyspnea on exertion, no orthopnea no PND.    Plan:     Through informed decision making process incorporating patients unique circumstances, the following treatment plan will be initiated:    1.  Prescription drug management of cardiovascular medication for efficacy, adherence to treatment, side effect assessment and polypharmacy. Current treatment clinically warranted and to continue with following modifications:    - Cozaar 25mg at bedtime or 2 hours after morning dose of coreg  -  Jardiance 10mg daily    2.  CXR pa/lat - follow up on pleural effusions noted on echo    3.  One week labs (lipids, chem6)    4.  MUGA after July 25, 2024 (ICM)    5. Return for follow-up; in the interim, contact the office if new symptoms arise.  Dr. Coelho after testing     6.  If weight loss continues then you need to contact PCP    Karen James  MSN, APRN-CNP, PMHNP-BC  [facility]    Please excuse any errors in grammar or translation related to this dictation. Voice recognition software was utilized to prepare this document.

## 2024-06-19 NOTE — PATIENT INSTRUCTIONS
Please bring all medicines, vitamins, and herbal supplements with you when you come to the office.    Prescriptions will not be filled unless you are compliant with your follow up appointments or have a follow up appointment scheduled as per instruction of your physician. Refills should be requested at the time of your visit.     PLAN:   Through informed decision making process incorporating patients unique circumstances, the following treatment plan will be initiated:    1.  Prescription drug management of cardiovascular medication for efficacy, adherence to treatment, side effect assessment and polypharmacy. Current treatment clinically warranted and to continue with following modifications:    - Cozaar 25mg at bedtime or 2 hours after morning dose of coreg  -  Jardiance 10mg daily    2.  CXR pa/lat - follow up on pleural effusions noted on echo    3.  One week labs (lipids, chem6)    4.  MUGA after July 25, 2024 (ICM)    5. Return for follow-up; in the interim, contact the office if new symptoms arise.  Dr. Coelho after testing     6.  If weight loss continues then you need to contact PCP

## 2024-06-19 NOTE — ASSESSMENT & PLAN NOTE
I believe at time of hospitalization he had a pericardial effusion and was treated with colchicine -repeat TTE June 2024 no evidence of pericardial effusion but it does show a moderate left pleural effusion.

## 2024-06-19 NOTE — ASSESSMENT & PLAN NOTE
DICM HFrEF 30% June 2024 TTE ( April 25, 2024 TTE 30-35%)  FC II Stage C    GDMT  Coreg: April 2024 discharge  Spironolactone: April 2024 discharge  Jardiance: to pharmacy today  ACE/ARB: Hindered due to hypotension but will attempt to add low-dose Cozaar today.    Right bundle branch block with

## 2024-06-19 NOTE — ASSESSMENT & PLAN NOTE
June 2024 echocardiogram with incidental finding of a moderate left pleural effusion  He denies any type of shortness of breath, no dyspnea on exertion, no orthopnea no PND.

## 2024-06-19 NOTE — ASSESSMENT & PLAN NOTE
April 25, 2025 AL STEMI emergently Dr. Coelho  Proximal LAD PCI/[place] 3 x 18 and 2.75 x 15 mm   Ostial Diag1 & Diag2 85-90%  Had staged procedure:  Mid RCA PCI/[place]athan 3.5 x 30 mm  Provisional PTCA PLV  There is also staged procedure to the circumflex proximal and mid but details are not available    LVEF 30 to 35% with akinetic anterior, anterior septal and apical walls

## 2024-06-26 ENCOUNTER — HOSPITAL ENCOUNTER
Dept: HOSPITAL 101 - LAB | Age: 69
Discharge: HOME | End: 2024-06-26
Payer: COMMERCIAL

## 2024-06-26 DIAGNOSIS — Z98.61: ICD-10-CM

## 2024-06-26 DIAGNOSIS — I31.39: ICD-10-CM

## 2024-06-26 DIAGNOSIS — I21.9: ICD-10-CM

## 2024-06-26 DIAGNOSIS — J90: ICD-10-CM

## 2024-06-26 DIAGNOSIS — I25.5: ICD-10-CM

## 2024-06-26 DIAGNOSIS — I25.10: ICD-10-CM

## 2024-06-26 DIAGNOSIS — E78.2: Primary | ICD-10-CM

## 2024-06-26 PROCEDURE — 93798 PHYS/QHP OP CAR RHAB W/ECG: CPT

## 2024-06-26 PROCEDURE — 71046 X-RAY EXAM CHEST 2 VIEWS: CPT

## 2024-06-28 ENCOUNTER — HOSPITAL ENCOUNTER (OUTPATIENT)
Dept: HOSPITAL 101 - CR | Age: 69
LOS: 2 days | Discharge: HOME | End: 2024-06-30
Payer: COMMERCIAL

## 2024-06-28 DIAGNOSIS — I21.9: ICD-10-CM

## 2024-06-28 DIAGNOSIS — Z98.61: Primary | ICD-10-CM

## 2024-06-28 PROCEDURE — 93798 PHYS/QHP OP CAR RHAB W/ECG: CPT

## 2024-07-01 ENCOUNTER — HOSPITAL ENCOUNTER (OUTPATIENT)
Dept: HOSPITAL 101 - CR | Age: 69
LOS: 42 days | Discharge: HOME | End: 2024-08-12
Payer: COMMERCIAL

## 2024-07-01 ENCOUNTER — HOSPITAL ENCOUNTER
Dept: HOSPITAL 101 - LAB | Age: 69
Discharge: HOME | End: 2024-07-01
Payer: COMMERCIAL

## 2024-07-01 DIAGNOSIS — Z98.61: Primary | ICD-10-CM

## 2024-07-01 DIAGNOSIS — I25.2: ICD-10-CM

## 2024-07-01 DIAGNOSIS — E78.2: ICD-10-CM

## 2024-07-01 DIAGNOSIS — I25.10: Primary | ICD-10-CM

## 2024-07-01 DIAGNOSIS — I25.5: ICD-10-CM

## 2024-07-01 LAB
ALANINE AMINOTRANSFERASE: 27 U/L (ref 16–63)
ANION GAP: 13.5
ASPARTATE AMINO TRANSFERASE: 12 U/L (ref 15–37)
BLOOD UREA NITROGEN: 21 MG/DL (ref 7–18)
CALCIUM: 9.4 MG/DL (ref 8.5–10.1)
CARBON DIOXIDE: 26.8 MMOL/L (ref 21–32)
CHLORIDE: 102 MMOL/L (ref 98–107)
CHOLESTEROL: 177 MG/DL (ref ?–200)
ESTIMATED GFR (AFRICAN AMERICA: >60 (ref 60–?)
ESTIMATED GFR (NON-AFRICAN AME: >60 (ref 60–?)
GLUCOSE: 100 MG/DL (ref 74–106)
HDL CHOLESTEROL: 43 MG/DL (ref 40–60)
POTASSIUM: 4.3 MMOL/L (ref 3.5–5.1)
SODIUM: 138 MMOL/L (ref 136–145)
TRIGLYCERIDES: 82 MG/DL (ref ?–150)
VLDL CHOLESTEROL: 16.4 MG/DL

## 2024-07-01 PROCEDURE — 93798 PHYS/QHP OP CAR RHAB W/ECG: CPT

## 2024-07-01 PROCEDURE — 80061 LIPID PANEL: CPT

## 2024-07-01 PROCEDURE — 36415 COLL VENOUS BLD VENIPUNCTURE: CPT

## 2024-07-01 PROCEDURE — 84460 ALANINE AMINO (ALT) (SGPT): CPT

## 2024-07-01 PROCEDURE — 80048 BASIC METABOLIC PNL TOTAL CA: CPT

## 2024-07-01 PROCEDURE — 84450 TRANSFERASE (AST) (SGOT): CPT

## 2024-07-03 ENCOUNTER — TELEPHONE (OUTPATIENT)
Dept: CARDIOLOGY | Facility: CLINIC | Age: 69
End: 2024-07-03
Payer: COMMERCIAL

## 2024-07-03 NOTE — TELEPHONE ENCOUNTER
Phoned patient to discuss getting scheduled to see Dr. Prasanna Dobbs MD ASAP to discuss ICD implant. Scheduling to call today to get OV scheduled. Patient verbalized understanding.

## 2024-07-03 NOTE — TELEPHONE ENCOUNTER
----- Message from Prasanna Guy MD sent at 7/3/2024  2:28 PM EDT -----  Regarding: FW: aicd  Add on to my schedule ASAP to discuss ICD  ----- Message -----  From: Jacque Segura LPN  Sent: 7/2/2024   1:37 PM EDT  To: Prasanna Guy MD  Subject: aicd                                             Please review upon return. OK to schedule   ----- Message -----  From: Prasanna Coelho DO  Sent: 7/1/2024   4:28 PM EDT  To: [facility] Clinical Support Staff    Patient needs AICD, please schedule with Dr. Guy before he retires

## 2024-07-09 ENCOUNTER — PATIENT OUTREACH (OUTPATIENT)
Dept: CARDIOLOGY | Facility: CLINIC | Age: 69
End: 2024-07-09

## 2024-07-09 ENCOUNTER — APPOINTMENT (OUTPATIENT)
Dept: CARDIOLOGY | Facility: CLINIC | Age: 69
End: 2024-07-09
Payer: COMMERCIAL

## 2024-07-09 VITALS
HEART RATE: 62 BPM | BODY MASS INDEX: 27.72 KG/M2 | DIASTOLIC BLOOD PRESSURE: 64 MMHG | SYSTOLIC BLOOD PRESSURE: 108 MMHG | HEIGHT: 71 IN | WEIGHT: 198 LBS

## 2024-07-09 DIAGNOSIS — I50.9 CONGESTIVE HEART FAILURE, UNSPECIFIED HF CHRONICITY, UNSPECIFIED HEART FAILURE TYPE (MULTI): ICD-10-CM

## 2024-07-09 DIAGNOSIS — I25.5 CARDIOMYOPATHY, ISCHEMIC: ICD-10-CM

## 2024-07-09 DIAGNOSIS — I21.3 ST ELEVATION MYOCARDIAL INFARCTION (STEMI), UNSPECIFIED ARTERY (MULTI): ICD-10-CM

## 2024-07-09 DIAGNOSIS — Z87.891 FORMER SMOKER: ICD-10-CM

## 2024-07-09 DIAGNOSIS — Z98.61 HISTORY OF PTCA: ICD-10-CM

## 2024-07-09 PROCEDURE — 1159F MED LIST DOCD IN RCRD: CPT | Performed by: INTERNAL MEDICINE

## 2024-07-09 PROCEDURE — 99214 OFFICE O/P EST MOD 30 MIN: CPT | Performed by: INTERNAL MEDICINE

## 2024-07-09 PROCEDURE — 93000 ELECTROCARDIOGRAM COMPLETE: CPT | Performed by: INTERNAL MEDICINE

## 2024-07-09 PROCEDURE — 1036F TOBACCO NON-USER: CPT | Performed by: INTERNAL MEDICINE

## 2024-07-09 PROCEDURE — 3008F BODY MASS INDEX DOCD: CPT | Performed by: INTERNAL MEDICINE

## 2024-07-09 NOTE — PROGRESS NOTES
"Subjective   Juan A Liang is a 69 y.o. male            HPI     Patient is seen at the request of Dr. Coelho for consideration of defibrillator implant.  He is individual with an acute infarct with revascularization.  He was started on several guideline directed therapies but ejection fraction was 30%.  Unbeknownst to me he recently saw the nurse practitioner who added Jardiance and losartan and with these changes he has had a dramatic improvement in his functional status and potentially improvement in his ejection fraction based upon his abrupt change and improvement in symptomatology.    He denies any angina.  He has no heart failure symptoms now.  He denies any arrhythmia symptomatology whatsoever.  He also notes that he has a MUGA scan pending.    Advised him and his wife that because he was not previously on a full regimen of guideline directed therapy his ejection fraction may not have improved.  Now that he is treated more appropriately he may realize significant improvement and thus forego the need for defibrillator implant.  Because of this I recommend he continue on current therapy as is and we await the results of his MUGA scan.  I did suggest to him and his wife an EKG today to evaluate for left bundle branch block.  He does not have it and hence he would not necessitate a biventricular device implant.    Review of other matters including his lipids appears to demonstrate adequate control.  Because of all the above we suggest no adjustments and change in medication but instead wait for the MUGA scan.      Vitals:    07/09/24 0914   BP: 108/64   BP Location: Right arm   Patient Position: Sitting   Pulse: 62   Weight: 89.8 kg (198 lb)   Height: 1.803 m (5' 11\")      EKG done in office today   Objective   Physical Exam  Constitutional:       Appearance: Normal appearance.   HENT:      Nose: Nose normal.   Neck:      Vascular: No carotid bruit.   Cardiovascular:      Rate and Rhythm: Normal rate.      " Pulses: Normal pulses.      Heart sounds: Normal heart sounds.   Pulmonary:      Effort: Pulmonary effort is normal.   Abdominal:      General: Bowel sounds are normal.      Palpations: Abdomen is soft.   Musculoskeletal:         General: Normal range of motion.      Cervical back: Normal range of motion.      Right lower leg: No edema.      Left lower leg: No edema.   Skin:     General: Skin is warm and dry.   Neurological:      General: No focal deficit present.      Mental Status: He is alert.   Psychiatric:         Mood and Affect: Mood normal.         Behavior: Behavior normal.         Thought Content: Thought content normal.         Judgment: Judgment normal.         Allergies  Brilinta [ticagrelor]     Current Medications    Current Outpatient Medications:     albuterol 90 mcg/actuation inhaler, INHALE 2 PUFFS EVERY 4-6 HOURS Inhaled as needed for 90 days, Disp: , Rfl:     aspirin 81 mg EC tablet, Take 1 tablet (81 mg) by mouth once daily., Disp: , Rfl:     carvedilol (Coreg) 3.125 mg tablet, Take 1 tablet (3.125 mg) by mouth 2 times daily (morning and late afternoon)., Disp: , Rfl:     clopidogrel (Plavix) 75 mg tablet, Take 1 tablet (75 mg) by mouth once daily., Disp: 90 tablet, Rfl: 3    empagliflozin (Jardiance) 10 mg, Take 1 tablet (10 mg) by mouth once daily., Disp: 30 tablet, Rfl: 11    losartan (Cozaar) 25 mg tablet, Take 1 tablet (25 mg) by mouth once daily., Disp: 30 tablet, Rfl: 11    nitroglycerin (Nitrostat) 0.4 mg SL tablet, Place 1 tablet (0.4 mg) under the tongue., Disp: , Rfl:     rosuvastatin (Crestor) 5 mg tablet, Take 1 tablet (5 mg) by mouth every other day., Disp: 45 tablet, Rfl: 3    spironolactone (Aldactone) 25 mg tablet, Take 0.5 tablets (12.5 mg) by mouth twice a day., Disp: , Rfl:                      Assessment/Plan   1. Congestive heart failure, unspecified HF chronicity, unspecified heart failure type (Multi)  Improving on guideline directed therapy specially after further  intensification    2. Cardiomyopathy, ischemic  As a consequence of recent infarct    3. ST elevation myocardial infarction (STEMI), unspecified artery (Multi)  With emergency PCI    4. History of PTCA  A durable result has been achieved with no anginal symptoms    5. BMI 27.0-27.9,adult  The merits of weight loss were advocated    6. Former smoker  Congratulated on cessation      Scribe Attestation  By signing my name below, I, Ventura Stephens LPNibe   attest that this documentation has been prepared under the direction and in the presence of Prasanna Dobbs MD.     Provider Attestation - Scribe documentation    All medical record entries made by the Scribe were at my direction and personally dictated by me. I have reviewed the chart and agree that the record accurately reflects my personal performance of the history, physical exam, discussion and plan.

## 2024-07-09 NOTE — LETTER
July 9, 2024     Ike Johns MD  [address]  [facility] [address]    Patient: Juan A Liang   YOB: 1955   Date of Visit: 7/9/2024       Dear Dr. Ike Johns MD:    Thank you for referring Juan A Liang to me for evaluation. Below are my notes for this consultation.  If you have questions, please do not hesitate to call me. I look forward to following your patient along with you.       Sincerely,     Prasanna Dobbs MD      CC: No Recipients  ______________________________________________________________________________________    Subjective   Juan A Liang is a 69 y.o. male            HPI     Patient is seen at the request of Dr. Coelho for consideration of defibrillator implant.  He is individual with an acute infarct with revascularization.  He was started on several guideline directed therapies but ejection fraction was 30%.  Unbeknownst to me he recently saw the nurse practitioner who added Jardiance and losartan and with these changes he has had a dramatic improvement in his functional status and potentially improvement in his ejection fraction based upon his abrupt change and improvement in symptomatology.    He denies any angina.  He has no heart failure symptoms now.  He denies any arrhythmia symptomatology whatsoever.  He also notes that he has a MUGA scan pending.    Advised him and his wife that because he was not previously on a full regimen of guideline directed therapy his ejection fraction may not have improved.  Now that he is treated more appropriately he may realize significant improvement and thus forego the need for defibrillator implant.  Because of this I recommend he continue on current therapy as is and we await the results of his MUGA scan.  I did suggest to him and his wife an EKG today to evaluate for left bundle branch block.  He does not have it and hence he would not necessitate a biventricular device implant.    Review of other matters  "including his lipids appears to demonstrate adequate control.  Because of all the above we suggest no adjustments and change in medication but instead wait for the MUGA scan.      Vitals:    07/09/24 0914   BP: 108/64   BP Location: Right arm   Patient Position: Sitting   Pulse: 62   Weight: 89.8 kg (198 lb)   Height: 1.803 m (5' 11\")      EKG done in office today   Objective   Physical Exam  Constitutional:       Appearance: Normal appearance.   HENT:      Nose: Nose normal.   Neck:      Vascular: No carotid bruit.   Cardiovascular:      Rate and Rhythm: Normal rate.      Pulses: Normal pulses.      Heart sounds: Normal heart sounds.   Pulmonary:      Effort: Pulmonary effort is normal.   Abdominal:      General: Bowel sounds are normal.      Palpations: Abdomen is soft.   Musculoskeletal:         General: Normal range of motion.      Cervical back: Normal range of motion.      Right lower leg: No edema.      Left lower leg: No edema.   Skin:     General: Skin is warm and dry.   Neurological:      General: No focal deficit present.      Mental Status: He is alert.   Psychiatric:         Mood and Affect: Mood normal.         Behavior: Behavior normal.         Thought Content: Thought content normal.         Judgment: Judgment normal.         Allergies  Brilinta [ticagrelor]     Current Medications    Current Outpatient Medications:   •  albuterol 90 mcg/actuation inhaler, INHALE 2 PUFFS EVERY 4-6 HOURS Inhaled as needed for 90 days, Disp: , Rfl:   •  aspirin 81 mg EC tablet, Take 1 tablet (81 mg) by mouth once daily., Disp: , Rfl:   •  carvedilol (Coreg) 3.125 mg tablet, Take 1 tablet (3.125 mg) by mouth 2 times daily (morning and late afternoon)., Disp: , Rfl:   •  clopidogrel (Plavix) 75 mg tablet, Take 1 tablet (75 mg) by mouth once daily., Disp: 90 tablet, Rfl: 3  •  empagliflozin (Jardiance) 10 mg, Take 1 tablet (10 mg) by mouth once daily., Disp: 30 tablet, Rfl: 11  •  losartan (Cozaar) 25 mg tablet, Take 1 " tablet (25 mg) by mouth once daily., Disp: 30 tablet, Rfl: 11  •  nitroglycerin (Nitrostat) 0.4 mg SL tablet, Place 1 tablet (0.4 mg) under the tongue., Disp: , Rfl:   •  rosuvastatin (Crestor) 5 mg tablet, Take 1 tablet (5 mg) by mouth every other day., Disp: 45 tablet, Rfl: 3  •  spironolactone (Aldactone) 25 mg tablet, Take 0.5 tablets (12.5 mg) by mouth twice a day., Disp: , Rfl:                      Assessment/Plan   1. Congestive heart failure, unspecified HF chronicity, unspecified heart failure type (Multi)  Improving on guideline directed therapy specially after further intensification    2. Cardiomyopathy, ischemic  As a consequence of recent infarct    3. ST elevation myocardial infarction (STEMI), unspecified artery (Multi)  With emergency PCI    4. History of PTCA  A durable result has been achieved with no anginal symptoms    5. BMI 27.0-27.9,adult  The merits of weight loss were advocated    6. Former smoker  Congratulated on cessation      Scribe Attestation  By signing my name below, I, Irlanda ALEXIS LPN  , Scribe   attest that this documentation has been prepared under the direction and in the presence of Prasanna Dobbs MD.     Provider Attestation - Scribe documentation    All medical record entries made by the Scribe were at my direction and personally dictated by me. I have reviewed the chart and agree that the record accurately reflects my personal performance of the history, physical exam, discussion and plan.

## 2024-07-09 NOTE — PATIENT INSTRUCTIONS
Please bring all medicines, vitamins, and herbal supplements with you when you come to the office.    Prescriptions will not be filled unless you are compliant with your follow up appointments or have a follow up appointment scheduled as per instruction of your physician. Refills should be requested at the time of your visit.   BMI was above normal measurement. Current weight: 89.8 kg (198 lb)  Weight change since last visit (-) denotes wt loss -3 lbs   Weight loss needed to achieve BMI 25: 19.1 Lbs  Weight loss needed to achieve BMI 30: -16.6 Lbs  Advised to Increase physical activity.

## 2024-07-15 ENCOUNTER — HOSPITAL ENCOUNTER (OUTPATIENT)
Dept: RADIOLOGY | Facility: CLINIC | Age: 69
Discharge: HOME | End: 2024-07-15
Payer: COMMERCIAL

## 2024-07-15 DIAGNOSIS — I25.5 CARDIOMYOPATHY, ISCHEMIC: ICD-10-CM

## 2024-07-15 PROCEDURE — 3430000001 HC RX 343 DIAGNOSTIC RADIOPHARMACEUTICALS: Performed by: NURSE PRACTITIONER

## 2024-07-15 PROCEDURE — A9560 TC99M LABELED RBC: HCPCS | Performed by: NURSE PRACTITIONER

## 2024-07-15 PROCEDURE — 78472 GATED HEART PLANAR SINGLE: CPT

## 2024-07-16 ENCOUNTER — TELEPHONE (OUTPATIENT)
Dept: CARDIOLOGY | Facility: CLINIC | Age: 69
End: 2024-07-16
Payer: COMMERCIAL

## 2024-07-16 DIAGNOSIS — I25.5 CARDIOMYOPATHY, ISCHEMIC: ICD-10-CM

## 2024-07-16 DIAGNOSIS — I50.9 CONGESTIVE HEART FAILURE, UNSPECIFIED HF CHRONICITY, UNSPECIFIED HEART FAILURE TYPE (MULTI): ICD-10-CM

## 2024-07-16 NOTE — TELEPHONE ENCOUNTER
Phoned patient, no answer left vm to callback.   Jardiance coverage was denied, sent to Dr. Prasanna Dobbs MD to be made aware.    Karen Foley NP is provided referral to meet with Dr. Hanson for possible Cardiac MRI and discuss AICD options since medication therapy is unaffordable and EF remains low on MUGA.

## 2024-07-16 NOTE — TELEPHONE ENCOUNTER
Cover my meds response, Denied. Patient will not be able to take jardiance the insurance coverage was denied. To Dr. Prasanna Dobbs MD for review.

## 2024-07-16 NOTE — TELEPHONE ENCOUNTER
Result Communication    Resulted Orders   NM heart blood pool ejection fraction wall motion (MUGA)    Narrative    Interpreted By:  Dago Benton and Beal Gina   STUDY:  MUGA      Performing facility:  [facility],  [address] [place] [address], [address],  [place] [address]  [facility] Provider:  Karen James RN, CNP  PCP:  Dr. INOCENTE CORREIA  Supervising provider:  Dago Benton MD, [facility]      INDICATION:  ICM      HISTORY:  Gender:  M; Age:  70 y/o ; Height:  .3 cm cm; Weight:   WT  89.812 kg kg.      CAD;  High Cholesterol;  Previous MI 4/2024  SOB;  ICM, CHF      Quit smoking.      PTCA on LAD 4/2024.      COMPARISON:  Previous echo testing completed 6/2024 EF 30%.          ACCESSION NUMBER(S):  [id number]      ORDERING CLINICIAN:  KAREN JAMES      TECHNIQUE:  The patient received an IV injection of 3 ml of stannous  pyrophosphate (PYP) using the in-vivo method of labeling red blood  cells. After 15 minutes the patient received another IV injection of  27.1 mCi of Technetium 99m pertechnetate. Planar images of the left  ventricle were obtained in the [place] 45, Lt Lateral and anterior  projections.      FINDINGS:  The right ventricle was  normal.  The left ventricle was  enlarged in size.  Regional wall motion was  abnormal.  Global resting LVEF was  abnormal-  at  37%.        Impression    Normal resting right ventricular function.  Abnormalresting left ventricular function.  Left ventricular ejection fraction is 37%.  No previous studies are available for comparison      Signed by: Dago Benton 7/15/2024 6:38 PM  Dictation workstation:   [id number]       2:58 PM

## 2024-07-16 NOTE — TELEPHONE ENCOUNTER
----- Message from Karen James sent at 7/16/2024  2:27 PM EDT -----  His heart muscle function is 37% so no much improvement.   Was he able to get Jardiance?  Please refer to Dr Hanson for ICD consideration - not sure if she would want to get cMRI to further assess EF; ICD are indicated for EF =/< 35% when patient are 36-40% I usually get evaluated by EP.  ----- Message -----  From: Interface, Radiology Results In  Sent: 7/15/2024   6:39 PM EDT  To: HAYDEE Forde-CNP

## 2024-07-17 NOTE — TELEPHONE ENCOUNTER
Patient phones back, results discussed. Patient states he is taking jardiance 10 mg daily. States would like referral to Dr. Hanson. Referral prepped and sent to Karen Foley NP for signature    Task sent to  to call and arrange once order signed.

## 2024-07-23 ENCOUNTER — TELEPHONE (OUTPATIENT)
Dept: CARDIOLOGY | Facility: CLINIC | Age: 69
End: 2024-07-23
Payer: COMMERCIAL

## 2024-07-23 NOTE — TELEPHONE ENCOUNTER
[facility] in [place] called ( [phone]) for permission to proceed with dental work and to inquire if patient needs antibiotic. April cath done , STEMI with benita stents. Echo ef 30-35%   Note of clearance requested. Dental cleaning and x rays need done as well as a back crown procedure needed...  Fax [phone]  To Dr. Prasanna Coelho, DO

## 2024-07-25 ENCOUNTER — APPOINTMENT (OUTPATIENT)
Dept: CARDIOLOGY | Facility: CLINIC | Age: 69
End: 2024-07-25
Payer: COMMERCIAL

## 2024-07-26 ENCOUNTER — TELEPHONE (OUTPATIENT)
Dept: CARDIOLOGY | Facility: CLINIC | Age: 69
End: 2024-07-26
Payer: COMMERCIAL

## 2024-07-26 NOTE — TELEPHONE ENCOUNTER
Phoned patient, reports unable to get the jardiance, he was going to go and pay out of pocket, because he is feeling much better with his therapy.  Advised patient I will reach out to the insurance company to clarify if there is a covered alternative medication, verbalized understanding.

## 2024-07-26 NOTE — TELEPHONE ENCOUNTER
Patient is having dental work and the dentist stated he needs an antibiotic before they will do the dental work and cleaning. [facility] in [place].   Send if applicable to the [facility] in [place]     Please advise

## 2024-07-26 NOTE — TELEPHONE ENCOUNTER
Karen James, APRN-CNP  [facility] Clinical Support Staff  See if farxiga 10mg daily is on formulary

## 2024-07-26 NOTE — TELEPHONE ENCOUNTER
Phoned [facility] they stated the medication is covered and at zero out of pocket. She will call [facility] in [place] and verify they have the right coverage information.  This information was verified while I waited on hold and the [facility] [place] reported they have it covered and no out of pocket expense. Patient notified.

## 2024-07-31 NOTE — TELEPHONE ENCOUNTER
Spoke with patient who verbalized understanding. Also called and left a message with [facility]

## 2024-07-31 NOTE — TELEPHONE ENCOUNTER
Signed orders faxed to [phone]. To be scanned to chart.   Attempted to phone patient detailed message left.    Task also addressed on 7/31/24.

## 2024-08-05 ENCOUNTER — APPOINTMENT (OUTPATIENT)
Dept: RADIOLOGY | Facility: CLINIC | Age: 69
End: 2024-08-05
Payer: COMMERCIAL

## 2024-08-12 ENCOUNTER — TELEPHONE (OUTPATIENT)
Dept: CARDIOLOGY | Facility: CLINIC | Age: 69
End: 2024-08-12
Payer: COMMERCIAL

## 2024-09-03 ENCOUNTER — APPOINTMENT (OUTPATIENT)
Dept: CARDIOLOGY | Facility: CLINIC | Age: 69
End: 2024-09-03
Payer: COMMERCIAL

## 2024-09-10 ENCOUNTER — APPOINTMENT (OUTPATIENT)
Dept: CARDIOLOGY | Facility: CLINIC | Age: 69
End: 2024-09-10
Payer: COMMERCIAL

## 2024-09-10 VITALS
HEIGHT: 71 IN | DIASTOLIC BLOOD PRESSURE: 64 MMHG | BODY MASS INDEX: 27.02 KG/M2 | HEART RATE: 64 BPM | WEIGHT: 193 LBS | SYSTOLIC BLOOD PRESSURE: 114 MMHG

## 2024-09-10 DIAGNOSIS — I50.9 CONGESTIVE HEART FAILURE, NYHA CLASS 1, UNSPECIFIED CONGESTIVE HEART FAILURE TYPE (MULTI): ICD-10-CM

## 2024-09-10 DIAGNOSIS — I25.10 ASHD (ARTERIOSCLEROTIC HEART DISEASE): ICD-10-CM

## 2024-09-10 DIAGNOSIS — E78.2 MIXED HYPERLIPIDEMIA: ICD-10-CM

## 2024-09-10 DIAGNOSIS — I25.5 CARDIOMYOPATHY, ISCHEMIC: ICD-10-CM

## 2024-09-10 DIAGNOSIS — I21.3 ST ELEVATION MYOCARDIAL INFARCTION (STEMI), UNSPECIFIED ARTERY (MULTI): ICD-10-CM

## 2024-09-10 DIAGNOSIS — Z87.891 FORMER SMOKER: ICD-10-CM

## 2024-09-10 DIAGNOSIS — Z98.61 HISTORY OF PTCA: ICD-10-CM

## 2024-09-10 PROCEDURE — 1160F RVW MEDS BY RX/DR IN RCRD: CPT | Performed by: INTERNAL MEDICINE

## 2024-09-10 PROCEDURE — 99213 OFFICE O/P EST LOW 20 MIN: CPT | Performed by: INTERNAL MEDICINE

## 2024-09-10 PROCEDURE — 1159F MED LIST DOCD IN RCRD: CPT | Performed by: INTERNAL MEDICINE

## 2024-09-10 PROCEDURE — 1036F TOBACCO NON-USER: CPT | Performed by: INTERNAL MEDICINE

## 2024-09-10 PROCEDURE — 3008F BODY MASS INDEX DOCD: CPT | Performed by: INTERNAL MEDICINE

## 2024-09-10 RX ORDER — TIOTROPIUM BROMIDE 18 UG/1
1 CAPSULE ORAL; RESPIRATORY (INHALATION)
COMMUNITY

## 2024-09-10 RX ORDER — POLYETHYLENE GLYCOL 3350 17 G/17G
17 POWDER, FOR SOLUTION ORAL DAILY
COMMUNITY

## 2024-09-10 RX ORDER — MULTIVITAMIN/IRON/FOLIC ACID 18MG-0.4MG
1 TABLET ORAL DAILY
COMMUNITY

## 2024-09-10 NOTE — LETTER
"September 10, 2024     Ike Johns MD  [address] [facility] [address]    Patient: Juan A Liang   YOB: 1955   Date of Visit: 9/10/2024       Dear Dr. Ike Johns MD:    Thank you for referring Juan A Liang to me for evaluation. Below are my notes for this consultation.  If you have questions, please do not hesitate to call me. I look forward to following your patient along with you.       Sincerely,     Prasanna Coelho, DO      CC: No Recipients  ______________________________________________________________________________________    Subjective   Juan A Liang is a 69 y.o. male       Chief Complaint    Follow-up          69-year-old gentleman here for follow-up and is doing very well denies any cardiovascular events since revascularization in April 2024, denies any hospitalizations or nitrate usage.  He is back to working on a regular basis, retiring in 4 months; his NYHA class is I/C    Recent MUGA scan revealed ejection fraction of 37%.  He is already seen electrophysiology, there is no need for AICD at least at this moment.  He has ongoing pill-rolling tremor he states it is secondary to his exposure to ground water at [employer]    He remains on appropriate GDMT and DAPT    Recommendations, continue current therapies follow-up in 6 months         Review of Systems   All other systems reviewed and are negative.           Vitals:    09/10/24 1048   BP: 114/64   BP Location: Left arm   Patient Position: Sitting   Pulse: 64   Weight: 87.5 kg (193 lb)   Height: 1.791 m (5' 10.5\")        Objective   Physical Exam  Constitutional:       Appearance: Normal appearance.   HENT:      Nose: Nose normal.   Neck:      Vascular: No carotid bruit.   Cardiovascular:      Rate and Rhythm: Normal rate.      Pulses: Normal pulses.      Heart sounds: Normal heart sounds.   Pulmonary:      Effort: Pulmonary effort is normal.   Abdominal:      General: Bowel sounds are normal.      " Palpations: Abdomen is soft.   Musculoskeletal:         General: Normal range of motion.      Cervical back: Normal range of motion.      Right lower leg: No edema.      Left lower leg: No edema.   Skin:     General: Skin is warm and dry.   Neurological:      General: No focal deficit present.      Mental Status: He is alert.   Psychiatric:         Mood and Affect: Mood normal.         Behavior: Behavior normal.         Thought Content: Thought content normal.         Judgment: Judgment normal.         Allergies  Brilinta [ticagrelor]     Current Medications    Current Outpatient Medications:   •  albuterol 90 mcg/actuation inhaler, INHALE 2 PUFFS EVERY 4-6 HOURS Inhaled as needed for 90 days, Disp: , Rfl:   •  aspirin 81 mg EC tablet, Take 1 tablet (81 mg) by mouth once daily., Disp: , Rfl:   •  b complex 0.4 mg tablet, Take 1 tablet by mouth once daily., Disp: , Rfl:   •  carvedilol (Coreg) 3.125 mg tablet, Take 1 tablet (3.125 mg) by mouth 2 times daily (morning and late afternoon)., Disp: , Rfl:   •  clopidogrel (Plavix) 75 mg tablet, Take 1 tablet (75 mg) by mouth once daily., Disp: 90 tablet, Rfl: 3  •  empagliflozin (Jardiance) 10 mg, Take 1 tablet (10 mg) by mouth once daily., Disp: 30 tablet, Rfl: 11  •  losartan (Cozaar) 25 mg tablet, Take 1 tablet (25 mg) by mouth once daily., Disp: 30 tablet, Rfl: 11  •  nitroglycerin (Nitrostat) 0.4 mg SL tablet, Place 1 tablet (0.4 mg) under the tongue., Disp: , Rfl:   •  polyethylene glycol (Glycolax, Miralax) 17 gram packet, Take 17 g by mouth once daily., Disp: , Rfl:   •  rosuvastatin (Crestor) 5 mg tablet, Take 1 tablet (5 mg) by mouth every other day., Disp: 45 tablet, Rfl: 3  •  spironolactone (Aldactone) 25 mg tablet, Take 0.5 tablets (12.5 mg) by mouth twice a day., Disp: , Rfl:   •  tiotropium (Spiriva) 18 mcg inhalation capsule, Place 1 capsule (18 mcg) into inhaler and inhale once daily., Disp: , Rfl:                      Assessment/Plan   1. ASHD  (arteriosclerotic heart disease)  Follow Up In Cardiology      2. Cardiomyopathy, ischemic        3. Congestive heart failure, NYHA class 1, unspecified congestive heart failure type (Multi)        4. History of PTCA        5. ST elevation myocardial infarction (STEMI), unspecified artery (Multi)        6. Mixed hyperlipidemia        7. Former smoker        8. BMI 27.0-27.9,adult                 Scribe Attestation  By signing my name below, IJacque LPN  , Scribe   attest that this documentation has been prepared under the direction and in the presence of Prasanna Coelho DO.     Provider Attestation - Scribe documentation    All medical record entries made by the Scribe were at my direction and personally dictated by me. I have reviewed the chart and agree that the record accurately reflects my personal performance of the history, physical exam, discussion and plan.

## 2024-09-10 NOTE — PROGRESS NOTES
"Subjective   Juan A Liang is a 69 y.o. male       Chief Complaint    Follow-up          69-year-old gentleman here for follow-up and is doing very well denies any cardiovascular events since revascularization in April 2024, denies any hospitalizations or nitrate usage.  He is back to working on a regular basis, retiring in 4 months; his NYHA class is I/C    Recent MUGA scan revealed ejection fraction of 37%.  He is already seen electrophysiology, there is no need for AICD at least at this moment.  He has ongoing pill-rolling tremor he states it is secondary to his exposure to ground water at [employer]    He remains on appropriate GDMT and DAPT    Recommendations, continue current therapies follow-up in 6 months         Review of Systems   All other systems reviewed and are negative.           Vitals:    09/10/24 1048   BP: 114/64   BP Location: Left arm   Patient Position: Sitting   Pulse: 64   Weight: 87.5 kg (193 lb)   Height: 1.791 m (5' 10.5\")        Objective   Physical Exam  Constitutional:       Appearance: Normal appearance.   HENT:      Nose: Nose normal.   Neck:      Vascular: No carotid bruit.   Cardiovascular:      Rate and Rhythm: Normal rate.      Pulses: Normal pulses.      Heart sounds: Normal heart sounds.   Pulmonary:      Effort: Pulmonary effort is normal.   Abdominal:      General: Bowel sounds are normal.      Palpations: Abdomen is soft.   Musculoskeletal:         General: Normal range of motion.      Cervical back: Normal range of motion.      Right lower leg: No edema.      Left lower leg: No edema.   Skin:     General: Skin is warm and dry.   Neurological:      General: No focal deficit present.      Mental Status: He is alert.   Psychiatric:         Mood and Affect: Mood normal.         Behavior: Behavior normal.         Thought Content: Thought content normal.         Judgment: Judgment normal.         Allergies  Brilinta [ticagrelor]     Current Medications    Current Outpatient " Medications:     albuterol 90 mcg/actuation inhaler, INHALE 2 PUFFS EVERY 4-6 HOURS Inhaled as needed for 90 days, Disp: , Rfl:     aspirin 81 mg EC tablet, Take 1 tablet (81 mg) by mouth once daily., Disp: , Rfl:     b complex 0.4 mg tablet, Take 1 tablet by mouth once daily., Disp: , Rfl:     carvedilol (Coreg) 3.125 mg tablet, Take 1 tablet (3.125 mg) by mouth 2 times daily (morning and late afternoon)., Disp: , Rfl:     clopidogrel (Plavix) 75 mg tablet, Take 1 tablet (75 mg) by mouth once daily., Disp: 90 tablet, Rfl: 3    empagliflozin (Jardiance) 10 mg, Take 1 tablet (10 mg) by mouth once daily., Disp: 30 tablet, Rfl: 11    losartan (Cozaar) 25 mg tablet, Take 1 tablet (25 mg) by mouth once daily., Disp: 30 tablet, Rfl: 11    nitroglycerin (Nitrostat) 0.4 mg SL tablet, Place 1 tablet (0.4 mg) under the tongue., Disp: , Rfl:     polyethylene glycol (Glycolax, Miralax) 17 gram packet, Take 17 g by mouth once daily., Disp: , Rfl:     rosuvastatin (Crestor) 5 mg tablet, Take 1 tablet (5 mg) by mouth every other day., Disp: 45 tablet, Rfl: 3    spironolactone (Aldactone) 25 mg tablet, Take 0.5 tablets (12.5 mg) by mouth twice a day., Disp: , Rfl:     tiotropium (Spiriva) 18 mcg inhalation capsule, Place 1 capsule (18 mcg) into inhaler and inhale once daily., Disp: , Rfl:                      Assessment/Plan   1. ASHD (arteriosclerotic heart disease)  Follow Up In Cardiology      2. Cardiomyopathy, ischemic        3. Congestive heart failure, NYHA class 1, unspecified congestive heart failure type (Multi)        4. History of PTCA        5. ST elevation myocardial infarction (STEMI), unspecified artery (Multi)        6. Mixed hyperlipidemia        7. Former smoker        8. BMI 27.0-27.9,adult                 Scribe Attestation  By signing my name below, I, Jacque ALVARADO LPN  , Scribe   attest that this documentation has been prepared under the direction and in the presence of DO. Marek Chambers  Attestation - Scribe documentation    All medical record entries made by the Scribe were at my direction and personally dictated by me. I have reviewed the chart and agree that the record accurately reflects my personal performance of the history, physical exam, discussion and plan.

## 2024-09-10 NOTE — PATIENT INSTRUCTIONS
Please bring all medicines, vitamins, and herbal supplements with you when you come to the office.    Prescriptions will not be filled unless you are compliant with your follow up appointments or have a follow up appointment scheduled as per instruction of your physician. Refills should be requested at the time of your visit.     BMI was above normal measurement. Current weight: 87.5 kg (193 lb)  Weight change since last visit (-) denotes wt loss -5 lbs   Weight loss needed to achieve BMI 25: 16.6 Lbs  Weight loss needed to achieve BMI 30: -18.6 Lbs  Provided instructions on dietary changes  Provided instructions on exercise.

## 2024-10-09 DIAGNOSIS — I50.9 CONGESTIVE HEART FAILURE, NYHA CLASS 1, UNSPECIFIED CONGESTIVE HEART FAILURE TYPE: ICD-10-CM

## 2024-10-09 DIAGNOSIS — I25.5 CARDIOMYOPATHY, ISCHEMIC: ICD-10-CM

## 2024-10-09 RX ORDER — SPIRONOLACTONE 25 MG/1
12.5 TABLET ORAL 2 TIMES DAILY
Qty: 90 TABLET | Refills: 3 | Status: SHIPPED | OUTPATIENT
Start: 2024-10-09 | End: 2025-10-09

## 2024-11-07 DIAGNOSIS — I25.5 CARDIOMYOPATHY, ISCHEMIC: ICD-10-CM

## 2024-11-07 DIAGNOSIS — I50.9 CONGESTIVE HEART FAILURE, NYHA CLASS 1, UNSPECIFIED CONGESTIVE HEART FAILURE TYPE: ICD-10-CM

## 2024-11-08 RX ORDER — SPIRONOLACTONE 25 MG/1
12.5 TABLET ORAL 2 TIMES DAILY
Qty: 90 TABLET | Refills: 3 | Status: SHIPPED | OUTPATIENT
Start: 2024-11-08 | End: 2025-11-08

## 2025-01-22 DIAGNOSIS — I25.10 ASHD (ARTERIOSCLEROTIC HEART DISEASE): ICD-10-CM

## 2025-01-23 RX ORDER — ASPIRIN 81 MG/1
81 TABLET ORAL DAILY
Qty: 90 TABLET | Refills: 3 | Status: SHIPPED | OUTPATIENT
Start: 2025-01-23 | End: 2026-01-23

## 2025-03-12 ENCOUNTER — APPOINTMENT (OUTPATIENT)
Dept: CARDIOLOGY | Facility: CLINIC | Age: 70
End: 2025-03-12
Payer: COMMERCIAL

## 2025-03-27 ENCOUNTER — APPOINTMENT (OUTPATIENT)
Dept: CARDIOLOGY | Facility: CLINIC | Age: 70
End: 2025-03-27
Payer: COMMERCIAL

## 2025-03-27 VITALS
SYSTOLIC BLOOD PRESSURE: 100 MMHG | HEIGHT: 70 IN | WEIGHT: 195 LBS | HEART RATE: 60 BPM | DIASTOLIC BLOOD PRESSURE: 64 MMHG | BODY MASS INDEX: 27.92 KG/M2

## 2025-03-27 DIAGNOSIS — Z98.61 HISTORY OF PTCA: ICD-10-CM

## 2025-03-27 DIAGNOSIS — I21.3 ST ELEVATION MYOCARDIAL INFARCTION (STEMI), UNSPECIFIED ARTERY (MULTI): ICD-10-CM

## 2025-03-27 DIAGNOSIS — I50.9 CONGESTIVE HEART FAILURE, NYHA CLASS 1, UNSPECIFIED CONGESTIVE HEART FAILURE TYPE: ICD-10-CM

## 2025-03-27 DIAGNOSIS — Z87.891 FORMER SMOKER: ICD-10-CM

## 2025-03-27 DIAGNOSIS — I50.42 CHRONIC COMBINED SYSTOLIC AND DIASTOLIC CONGESTIVE HEART FAILURE, NYHA CLASS 2: ICD-10-CM

## 2025-03-27 DIAGNOSIS — I25.5 CARDIOMYOPATHY, ISCHEMIC: ICD-10-CM

## 2025-03-27 DIAGNOSIS — E78.00 HYPERCHOLESTEREMIA: ICD-10-CM

## 2025-03-27 DIAGNOSIS — I25.10 ASHD (ARTERIOSCLEROTIC HEART DISEASE): ICD-10-CM

## 2025-03-27 PROCEDURE — 1036F TOBACCO NON-USER: CPT | Performed by: INTERNAL MEDICINE

## 2025-03-27 PROCEDURE — 1160F RVW MEDS BY RX/DR IN RCRD: CPT | Performed by: INTERNAL MEDICINE

## 2025-03-27 PROCEDURE — 3008F BODY MASS INDEX DOCD: CPT | Performed by: INTERNAL MEDICINE

## 2025-03-27 PROCEDURE — 99214 OFFICE O/P EST MOD 30 MIN: CPT | Performed by: INTERNAL MEDICINE

## 2025-03-27 PROCEDURE — 1159F MED LIST DOCD IN RCRD: CPT | Performed by: INTERNAL MEDICINE

## 2025-03-27 NOTE — LETTER
March 27, 2025     Ike Johns MD  [address]  [facility] [address]    Patient: Juan A Liang   YOB: 1955   Date of Visit: 3/27/2025       Dear Dr. Ike Johns MD:    Thank you for referring Juan A Liang to me for evaluation. Below are my notes for this consultation.  If you have questions, please do not hesitate to call me. I look forward to following your patient along with you.       Sincerely,     Prasanna Coelho, DO      CC: No Recipients  ______________________________________________________________________________________    Chief Complaint   Patient presents with   • Follow-up     Pt here for 6 month follow up for arteriosclerotic heart disease, denies cardiac c/o at this time.        Subjective   Juan A Liang is a 69 y.o. male     69-year-old gentleman returns for 6-month follow-up for ongoing cardiovascular care maintenance.  He is status post large anterior STEMI 1 year ago with primary revascularization-three-vessel intervention.  He denies any angina, hospitalizations, nitrate usage, recurrent events.  He has been active during the wintertime, participating in phase 3 cardiac rehab as well as walking 1-1/2 to 2 miles a couple times a week with his dog.    He is status post anterior ST elevation myocardial infarction with primary revascularization of the proximal/mid LAD with 2 drug-eluting stents, with interventions of the proximal and mid circumflex with 2 drug-eluting stents and the RCA with 1 drug-eluting stent    MUGA scan last year revealed persistent moderate LV dysfunction with ejection fraction of 37%.  He remains on appropriate GDMT as tolerated (low-dose ARB).    Recommendations, will review labs, continue current healthy lifestyle, activity and follow-up in 6 months         Review of Systems   All other systems reviewed and are negative.           Vitals:    03/27/25 0925   BP: 100/64   BP Location: Left arm   Patient Position: Sitting   Pulse: 60  "  Weight: 88.5 kg (195 lb)   Height: 1.778 m (5' 10\")        Objective   Physical Exam  Constitutional:       Appearance: Normal appearance.   HENT:      Nose: Nose normal.   Neck:      Vascular: No carotid bruit.   Cardiovascular:      Rate and Rhythm: Normal rate.      Pulses: Normal pulses.      Heart sounds: Normal heart sounds.   Pulmonary:      Effort: Pulmonary effort is normal.   Abdominal:      General: Bowel sounds are normal.      Palpations: Abdomen is soft.   Musculoskeletal:         General: Normal range of motion.      Cervical back: Normal range of motion.      Right lower leg: No edema.      Left lower leg: No edema.   Skin:     General: Skin is warm and dry.   Neurological:      General: No focal deficit present.      Mental Status: He is alert.   Psychiatric:         Mood and Affect: Mood normal.         Behavior: Behavior normal.         Thought Content: Thought content normal.         Judgment: Judgment normal.         Allergies  Brilinta [ticagrelor]     Current Medications    Current Outpatient Medications:   •  aspirin 81 mg EC tablet, Take 1 tablet (81 mg) by mouth once daily., Disp: 90 tablet, Rfl: 3  •  carvedilol (Coreg) 3.125 mg tablet, Take 1 tablet (3.125 mg) by mouth 2 times daily (morning and late afternoon)., Disp: , Rfl:   •  clopidogrel (Plavix) 75 mg tablet, Take 1 tablet (75 mg) by mouth once daily., Disp: 90 tablet, Rfl: 3  •  Combivent Respimat  mcg/actuation inhaler, Inhale 1 puff 4 times a day., Disp: , Rfl:   •  empagliflozin (Jardiance) 10 mg, Take 1 tablet (10 mg) by mouth once daily., Disp: 30 tablet, Rfl: 11  •  losartan (Cozaar) 25 mg tablet, Take 1 tablet (25 mg) by mouth once daily., Disp: 30 tablet, Rfl: 11  •  nitroglycerin (Nitrostat) 0.4 mg SL tablet, Place 1 tablet (0.4 mg) under the tongue., Disp: , Rfl:   •  polyethylene glycol (Glycolax, Miralax) 17 gram packet, Take 17 g by mouth once daily., Disp: , Rfl:   •  rosuvastatin (Crestor) 5 mg tablet, Take 1 " tablet (5 mg) by mouth every other day., Disp: 45 tablet, Rfl: 3  •  spironolactone (Aldactone) 25 mg tablet, Take 0.5 tablets (12.5 mg) by mouth 2 times a day., Disp: 90 tablet, Rfl: 3  •  zonisamide (Zonegran) 25 mg capsule, Take 1 capsule (25 mg) by mouth 2 times a day., Disp: , Rfl:                      Assessment/Plan   1. ASHD (arteriosclerotic heart disease)        2. History of PTCA        3. ST elevation myocardial infarction (STEMI), unspecified artery (Multi)        4. Cardiomyopathy, ischemic        5. Chronic combined systolic and diastolic congestive heart failure, NYHA class 2        6. BMI 27.0-27.9,adult        7. Former smoker        8. Hypercholesteremia        9. Congestive heart failure, NYHA class 1, unspecified congestive heart failure type                 Scribe Attestation  By signing my name below, IIrlanda LPN, Scribe   attest that this documentation has been prepared under the direction and in the presence of Prasanna Coelho DO.     Provider Attestation - Scribe documentation    All medical record entries made by the Scribe were at my direction and personally dictated by me. I have reviewed the chart and agree that the record accurately reflects my personal performance of the history, physical exam, discussion and plan.

## 2025-03-27 NOTE — PATIENT INSTRUCTIONS
Please bring all medicines, vitamins, and herbal supplements with you when you come to the office.    Prescriptions will not be filled unless you are compliant with your follow up appointments or have a follow up appointment scheduled as per instruction of your physician. Refills should be requested at the time of your visit.   BMI was above normal measurement. Current weight: 88.5 kg (195 lb)  Weight change since last visit (-) denotes wt loss 1.2 lbs   Weight loss needed to achieve BMI 25: 21.1 Lbs  Weight loss needed to achieve BMI 30: -13.6 Lbs  Provided instructions on dietary changes  Provided instructions on exercise.

## 2025-03-27 NOTE — PROGRESS NOTES
"Chief Complaint   Patient presents with    Follow-up     Pt here for 6 month follow up for arteriosclerotic heart disease, denies cardiac c/o at this time.        Subjective   Juan A Liang is a 69 y.o. male     69-year-old gentleman returns for 6-month follow-up for ongoing cardiovascular care maintenance.  He is status post large anterior STEMI 1 year ago with primary revascularization-three-vessel intervention.  He denies any angina, hospitalizations, nitrate usage, recurrent events.  He has been active during the wintertime, participating in phase 3 cardiac rehab as well as walking 1-1/2 to 2 miles a couple times a week with his dog.    He is status post anterior ST elevation myocardial infarction with primary revascularization of the proximal/mid LAD with 2 drug-eluting stents, with interventions of the proximal and mid circumflex with 2 drug-eluting stents and the RCA with 1 drug-eluting stent    MUGA scan last year revealed persistent moderate LV dysfunction with ejection fraction of 37%.  He remains on appropriate GDMT as tolerated (low-dose ARB).    Recommendations, will review labs, continue current healthy lifestyle, activity and follow-up in 6 months         Review of Systems   All other systems reviewed and are negative.           Vitals:    03/27/25 0925   BP: 100/64   BP Location: Left arm   Patient Position: Sitting   Pulse: 60   Weight: 88.5 kg (195 lb)   Height: 1.778 m (5' 10\")        Objective   Physical Exam  Constitutional:       Appearance: Normal appearance.   HENT:      Nose: Nose normal.   Neck:      Vascular: No carotid bruit.   Cardiovascular:      Rate and Rhythm: Normal rate.      Pulses: Normal pulses.      Heart sounds: Normal heart sounds.   Pulmonary:      Effort: Pulmonary effort is normal.   Abdominal:      General: Bowel sounds are normal.      Palpations: Abdomen is soft.   Musculoskeletal:         General: Normal range of motion.      Cervical back: Normal range of motion.      " Right lower leg: No edema.      Left lower leg: No edema.   Skin:     General: Skin is warm and dry.   Neurological:      General: No focal deficit present.      Mental Status: He is alert.   Psychiatric:         Mood and Affect: Mood normal.         Behavior: Behavior normal.         Thought Content: Thought content normal.         Judgment: Judgment normal.         Allergies  Brilinta [ticagrelor]     Current Medications    Current Outpatient Medications:     aspirin 81 mg EC tablet, Take 1 tablet (81 mg) by mouth once daily., Disp: 90 tablet, Rfl: 3    carvedilol (Coreg) 3.125 mg tablet, Take 1 tablet (3.125 mg) by mouth 2 times daily (morning and late afternoon)., Disp: , Rfl:     clopidogrel (Plavix) 75 mg tablet, Take 1 tablet (75 mg) by mouth once daily., Disp: 90 tablet, Rfl: 3    Combivent Respimat  mcg/actuation inhaler, Inhale 1 puff 4 times a day., Disp: , Rfl:     empagliflozin (Jardiance) 10 mg, Take 1 tablet (10 mg) by mouth once daily., Disp: 30 tablet, Rfl: 11    losartan (Cozaar) 25 mg tablet, Take 1 tablet (25 mg) by mouth once daily., Disp: 30 tablet, Rfl: 11    nitroglycerin (Nitrostat) 0.4 mg SL tablet, Place 1 tablet (0.4 mg) under the tongue., Disp: , Rfl:     polyethylene glycol (Glycolax, Miralax) 17 gram packet, Take 17 g by mouth once daily., Disp: , Rfl:     rosuvastatin (Crestor) 5 mg tablet, Take 1 tablet (5 mg) by mouth every other day., Disp: 45 tablet, Rfl: 3    spironolactone (Aldactone) 25 mg tablet, Take 0.5 tablets (12.5 mg) by mouth 2 times a day., Disp: 90 tablet, Rfl: 3    zonisamide (Zonegran) 25 mg capsule, Take 1 capsule (25 mg) by mouth 2 times a day., Disp: , Rfl:                      Assessment/Plan   1. ASHD (arteriosclerotic heart disease)        2. History of PTCA        3. ST elevation myocardial infarction (STEMI), unspecified artery (Multi)        4. Cardiomyopathy, ischemic        5. Chronic combined systolic and diastolic congestive heart failure, NYHA  class 2        6. BMI 27.0-27.9,adult        7. Former smoker        8. Hypercholesteremia        9. Congestive heart failure, NYHA class 1, unspecified congestive heart failure type                 Scribe Attestation  By signing my name below, I, Irlanda ALEXIS LPN  , [place]   attest that this documentation has been prepared under the direction and in the presence of Prasanna Coelho DO.     Provider Attestation - Scribe documentation    All medical record entries made by the Scribe were at my direction and personally dictated by me. I have reviewed the chart and agree that the record accurately reflects my personal performance of the history, physical exam, discussion and plan.

## 2025-04-20 DIAGNOSIS — Z98.61 HISTORY OF PTCA: ICD-10-CM

## 2025-04-20 DIAGNOSIS — I25.10 ASHD (ARTERIOSCLEROTIC HEART DISEASE): ICD-10-CM

## 2025-04-20 DIAGNOSIS — I25.5 CARDIOMYOPATHY, ISCHEMIC: ICD-10-CM

## 2025-04-20 DIAGNOSIS — I21.3 ST ELEVATION MYOCARDIAL INFARCTION (STEMI), UNSPECIFIED ARTERY (MULTI): ICD-10-CM

## 2025-04-23 RX ORDER — LOSARTAN POTASSIUM 25 MG/1
25 TABLET ORAL DAILY
Qty: 90 TABLET | Refills: 3 | Status: SHIPPED | OUTPATIENT
Start: 2025-04-23

## 2025-04-23 RX ORDER — CARVEDILOL 3.12 MG/1
3.12 TABLET ORAL
Qty: 180 TABLET | Refills: 3 | OUTPATIENT
Start: 2025-04-23

## 2025-04-23 RX ORDER — CLOPIDOGREL BISULFATE 75 MG/1
75 TABLET ORAL DAILY
Qty: 90 TABLET | Refills: 3 | OUTPATIENT
Start: 2025-04-23

## 2025-04-24 DIAGNOSIS — I25.5 CARDIOMYOPATHY, ISCHEMIC: ICD-10-CM

## 2025-04-24 DIAGNOSIS — I25.10 ASHD (ARTERIOSCLEROTIC HEART DISEASE): ICD-10-CM

## 2025-04-24 DIAGNOSIS — I21.3 ST ELEVATION MYOCARDIAL INFARCTION (STEMI), UNSPECIFIED ARTERY (MULTI): ICD-10-CM

## 2025-04-24 DIAGNOSIS — Z98.61 HISTORY OF PTCA: ICD-10-CM

## 2025-04-25 RX ORDER — CARVEDILOL 3.12 MG/1
3.12 TABLET ORAL
Qty: 180 TABLET | Refills: 3 | Status: SHIPPED | OUTPATIENT
Start: 2025-04-25 | End: 2026-04-25

## 2025-04-25 RX ORDER — CLOPIDOGREL BISULFATE 75 MG/1
75 TABLET ORAL DAILY
Qty: 90 TABLET | Refills: 3 | Status: SHIPPED | OUTPATIENT
Start: 2025-04-25 | End: 2026-04-25

## 2025-06-02 DIAGNOSIS — I25.5 CARDIOMYOPATHY, ISCHEMIC: ICD-10-CM

## 2025-07-13 DIAGNOSIS — E78.00 HYPERCHOLESTEREMIA: ICD-10-CM

## 2025-07-15 RX ORDER — ROSUVASTATIN CALCIUM 5 MG/1
5 TABLET, COATED ORAL EVERY OTHER DAY
Qty: 45 TABLET | Refills: 3 | Status: SHIPPED | OUTPATIENT
Start: 2025-07-15 | End: 2026-07-15

## 2025-10-07 ENCOUNTER — APPOINTMENT (OUTPATIENT)
Dept: CARDIOLOGY | Facility: CLINIC | Age: 70
End: 2025-10-07
Payer: COMMERCIAL